# Patient Record
Sex: FEMALE | Race: OTHER
[De-identification: names, ages, dates, MRNs, and addresses within clinical notes are randomized per-mention and may not be internally consistent; named-entity substitution may affect disease eponyms.]

---

## 2019-01-01 ENCOUNTER — HOSPITAL ENCOUNTER (INPATIENT)
Dept: HOSPITAL 54 - ER | Age: 84
LOS: 4 days | Discharge: HOME | DRG: 291 | End: 2019-01-05
Attending: INTERNAL MEDICINE | Admitting: INTERNAL MEDICINE
Payer: MEDICARE

## 2019-01-01 VITALS — DIASTOLIC BLOOD PRESSURE: 53 MMHG | SYSTOLIC BLOOD PRESSURE: 108 MMHG

## 2019-01-01 VITALS — BODY MASS INDEX: 31.02 KG/M2 | WEIGHT: 158 LBS | HEIGHT: 60 IN

## 2019-01-01 VITALS — DIASTOLIC BLOOD PRESSURE: 73 MMHG | SYSTOLIC BLOOD PRESSURE: 111 MMHG

## 2019-01-01 DIAGNOSIS — R60.0: ICD-10-CM

## 2019-01-01 DIAGNOSIS — L03.116: ICD-10-CM

## 2019-01-01 DIAGNOSIS — L30.4: ICD-10-CM

## 2019-01-01 DIAGNOSIS — E78.5: ICD-10-CM

## 2019-01-01 DIAGNOSIS — I13.0: Primary | ICD-10-CM

## 2019-01-01 DIAGNOSIS — M21.70: ICD-10-CM

## 2019-01-01 DIAGNOSIS — E83.42: ICD-10-CM

## 2019-01-01 DIAGNOSIS — L03.115: ICD-10-CM

## 2019-01-01 DIAGNOSIS — E66.3: ICD-10-CM

## 2019-01-01 DIAGNOSIS — M79.662: ICD-10-CM

## 2019-01-01 DIAGNOSIS — I25.10: ICD-10-CM

## 2019-01-01 DIAGNOSIS — Z96.642: ICD-10-CM

## 2019-01-01 DIAGNOSIS — I48.91: ICD-10-CM

## 2019-01-01 DIAGNOSIS — I50.33: ICD-10-CM

## 2019-01-01 DIAGNOSIS — Z95.3: ICD-10-CM

## 2019-01-01 DIAGNOSIS — Z95.1: ICD-10-CM

## 2019-01-01 DIAGNOSIS — I07.1: ICD-10-CM

## 2019-01-01 DIAGNOSIS — D63.8: ICD-10-CM

## 2019-01-01 DIAGNOSIS — Z95.0: ICD-10-CM

## 2019-01-01 DIAGNOSIS — K21.9: ICD-10-CM

## 2019-01-01 DIAGNOSIS — N18.9: ICD-10-CM

## 2019-01-01 DIAGNOSIS — I45.2: ICD-10-CM

## 2019-01-01 DIAGNOSIS — N17.0: ICD-10-CM

## 2019-01-01 LAB
BASOPHILS # BLD AUTO: 0 /CMM (ref 0–0.2)
BASOPHILS NFR BLD AUTO: 0.5 % (ref 0–2)
BUN SERPL-MCNC: 44 MG/DL (ref 7–18)
CALCIUM SERPL-MCNC: 8.6 MG/DL (ref 8.5–10.1)
CHLORIDE SERPL-SCNC: 103 MMOL/L (ref 98–107)
CO2 SERPL-SCNC: 24 MMOL/L (ref 21–32)
CREAT SERPL-MCNC: 2.1 MG/DL (ref 0.6–1.3)
EOSINOPHIL NFR BLD AUTO: 4.7 % (ref 0–6)
GLUCOSE SERPL-MCNC: 96 MG/DL (ref 74–106)
HCT VFR BLD AUTO: 30 % (ref 33–45)
HGB BLD-MCNC: 9.5 G/DL (ref 11.5–14.8)
LYMPHOCYTES NFR BLD AUTO: 1.4 /CMM (ref 0.8–4.8)
LYMPHOCYTES NFR BLD AUTO: 22 % (ref 20–44)
MCHC RBC AUTO-ENTMCNC: 32 G/DL (ref 31–36)
MCV RBC AUTO: 88 FL (ref 82–100)
MONOCYTES NFR BLD AUTO: 0.8 /CMM (ref 0.1–1.3)
MONOCYTES NFR BLD AUTO: 12.9 % (ref 2–12)
NEUTROPHILS # BLD AUTO: 3.8 /CMM (ref 1.8–8.9)
NEUTROPHILS NFR BLD AUTO: 59.9 % (ref 43–81)
NT-PROBNP SERPL-MCNC: 7410 PG/ML (ref 0–125)
PLATELET # BLD AUTO: 322 /CMM (ref 150–450)
POTASSIUM SERPL-SCNC: 4.1 MMOL/L (ref 3.5–5.1)
RBC # BLD AUTO: 3.37 MIL/UL (ref 4–5.2)
SODIUM SERPL-SCNC: 139 MMOL/L (ref 136–145)
WBC NRBC COR # BLD AUTO: 6.3 K/UL (ref 4.3–11)

## 2019-01-01 PROCEDURE — G0378 HOSPITAL OBSERVATION PER HR: HCPCS

## 2019-01-01 RX ADMIN — DEXTROSE MONOHYDRATE SCH MLS/HR: 50 INJECTION, SOLUTION INTRAVENOUS at 21:39

## 2019-01-01 RX ADMIN — MIRTAZAPINE SCH MG: 15 TABLET, FILM COATED ORAL at 21:38

## 2019-01-01 RX ADMIN — ALBUTEROL SULFATE SCH MG: 2.5 SOLUTION RESPIRATORY (INHALATION) at 16:30

## 2019-01-01 RX ADMIN — AMIODARONE HYDROCHLORIDE SCH MG: 200 TABLET ORAL at 18:08

## 2019-01-01 RX ADMIN — METOPROLOL TARTRATE SCH MG: 50 TABLET, FILM COATED ORAL at 21:39

## 2019-01-01 RX ADMIN — ALBUTEROL SULFATE SCH MG: 2.5 SOLUTION RESPIRATORY (INHALATION) at 20:22

## 2019-01-01 NOTE — NUR
RN TELE OPENING NOTES

RECEIVED PATIENT IN BED AWAKE, ALERT AND ORIENTED X3, VERBALLY RESPONSIVE ABLE TO MAKE NEEDS 
KNOWN. Guamanian SPEAKER. BREATHING EVEN AND UNLABORED. NO SOB NOTED. TOLERATING ROOM AIR. IV 
ON RIGHT G#20 INTACT AND PATENT. SKIN DRY AND WARM TO TOUCH. AFEBRILE. NO COMPLAINTS OF PAIN 
OR DISCOMFORT. NO FACIAL GRIMACING. ALL OTHER NEEDS ATTENDED TO. SAFETY MEASURES IN PLACE. 
CALL LIGHT WITHIN REACH. WILL CONTINUE TO MONITOR.

## 2019-01-01 NOTE — NUR
TELE/RN CLOSING NOTE



PATIENT IN BED IN STABLE CONDITION. A/O X 3. Korean SPEAKING. NO SIGNS OF ACUTE DISTRESS. 
NO COMPLAIN OF PAIN OR DISCOMFORT. ON TELE MONITOR NOTED WITH SINUS RHYTHM. ALL NEEDS 
ATTENDED TO. SON AT BEDSIDE. CALL LIGHT WITHIN REACH. WILL ENDORSE TO NEXT SHIFT FOR 
CONTINUITY OF CARE.

## 2019-01-01 NOTE — NUR
RT

SCANNED MEDICINE, PT REFUSED TREATMENT, NO DISTRESS NOTED. WILL RETURN MEDICINE. WILL 
CONTINUE TO MONITOR.

## 2019-01-01 NOTE — NUR
BIB  FROM HOME SOB X 24 HR PROGRESSIVELY WORST WITH DRE LEG EDEMA. ALERT 
AND ORIENTED X 3, Yakut SPEAKING, SON AT BEDSIDE FOR TRANSLATION. BREATHING 
EVENLY AND UNLABORED. DENIES ANY PAIN AT THIS TIME. IV ACCESS INITIATED. DR. HORTON AT BEDSIDE FOR EVAL. KEPT COMFORTABLE. WILL CONTINUE TO MONITOR 
ACCORDINGLY.

## 2019-01-01 NOTE — NUR
WHEELED PATIENT VIA GURNEY ACCOMPANIED BY EMT AND RN, IN NO APPARENT DISTRESS 
NOTED. AZALEA AT BEDSIDE AND RECEIVED THE PATIENT FOR VENKATA.

## 2019-01-01 NOTE — NUR
RN TELE NOTES

DR. ANDERSON HERE IN UNIT. MADE AWARE OF SON'S WISHES FOR PATIENT TO BE CPR ONLY, NO 
INTUBATION.

## 2019-01-01 NOTE — NUR
TELE/RN ADMISSION



PATIENT ADMITTED FROM ER IN STABLE CONDITION TRANSFERRED VIA STRETCHER. ADMITTING DIAGNOSIS 
CHF. A/O X 3, Kyrgyz SPEAKING. NO SIGNS OF ACUTE DISTRESS. NO COMPLAIN OF PAIN OR 
DISCOMFORT AT THIS TIME. ON ROOM AIR SATURATION 97 %. ON TELE MONITOR NOTED WITH SINUS 
RHYTHM IN 70'S. SKIN ASSESSMENT DONE NOTED WITH BILATERAL LOWER EXTREMITIES PITTING EDEMA 
+4. CONTINENT OF BOWEL AND BLADDER USES BEDPAN. AMBULATES WITH ASSIST AT HOME. ALL NEEDS 
ATTENDED TO. CALL LIGHT WITHIN REACH. WILL CONTINUE TO MONITOR TO ENSURE SAFETY.

## 2019-01-02 VITALS — DIASTOLIC BLOOD PRESSURE: 66 MMHG | SYSTOLIC BLOOD PRESSURE: 135 MMHG

## 2019-01-02 VITALS — DIASTOLIC BLOOD PRESSURE: 58 MMHG | SYSTOLIC BLOOD PRESSURE: 135 MMHG

## 2019-01-02 VITALS — SYSTOLIC BLOOD PRESSURE: 97 MMHG | DIASTOLIC BLOOD PRESSURE: 53 MMHG

## 2019-01-02 VITALS — DIASTOLIC BLOOD PRESSURE: 57 MMHG | SYSTOLIC BLOOD PRESSURE: 123 MMHG

## 2019-01-02 LAB
BASOPHILS # BLD AUTO: 0 /CMM (ref 0–0.2)
BASOPHILS NFR BLD AUTO: 0.3 % (ref 0–2)
BUN SERPL-MCNC: 43 MG/DL (ref 7–18)
CALCIUM SERPL-MCNC: 8.2 MG/DL (ref 8.5–10.1)
CHLORIDE SERPL-SCNC: 104 MMOL/L (ref 98–107)
CO2 SERPL-SCNC: 26 MMOL/L (ref 21–32)
CREAT SERPL-MCNC: 2.1 MG/DL (ref 0.6–1.3)
EOSINOPHIL NFR BLD AUTO: 4.9 % (ref 0–6)
GLUCOSE SERPL-MCNC: 116 MG/DL (ref 74–106)
HCT VFR BLD AUTO: 25 % (ref 33–45)
HGB BLD-MCNC: 8.2 G/DL (ref 11.5–14.8)
LYMPHOCYTES NFR BLD AUTO: 1.4 /CMM (ref 0.8–4.8)
LYMPHOCYTES NFR BLD AUTO: 21 % (ref 20–44)
MAGNESIUM SERPL-MCNC: 1.2 MG/DL (ref 1.8–2.4)
MCHC RBC AUTO-ENTMCNC: 33 G/DL (ref 31–36)
MCV RBC AUTO: 86 FL (ref 82–100)
MONOCYTES NFR BLD AUTO: 0.8 /CMM (ref 0.1–1.3)
MONOCYTES NFR BLD AUTO: 12.7 % (ref 2–12)
NEUTROPHILS # BLD AUTO: 4 /CMM (ref 1.8–8.9)
NEUTROPHILS NFR BLD AUTO: 61.1 % (ref 43–81)
PHOSPHATE SERPL-MCNC: 3.2 MG/DL (ref 2.5–4.9)
PLATELET # BLD AUTO: 306 /CMM (ref 150–450)
POTASSIUM SERPL-SCNC: 3.7 MMOL/L (ref 3.5–5.1)
RBC # BLD AUTO: 2.92 MIL/UL (ref 4–5.2)
SODIUM SERPL-SCNC: 142 MMOL/L (ref 136–145)
WBC NRBC COR # BLD AUTO: 6.5 K/UL (ref 4.3–11)

## 2019-01-02 RX ADMIN — ALBUTEROL SULFATE SCH MG: 2.5 SOLUTION RESPIRATORY (INHALATION) at 07:26

## 2019-01-02 RX ADMIN — ALBUTEROL SULFATE SCH MG: 2.5 SOLUTION RESPIRATORY (INHALATION) at 20:08

## 2019-01-02 RX ADMIN — MAGNESIUM SULFATE IN DEXTROSE SCH MLS/HR: 10 INJECTION, SOLUTION INTRAVENOUS at 22:48

## 2019-01-02 RX ADMIN — FUROSEMIDE SCH MG: 10 INJECTION INTRAVENOUS at 13:15

## 2019-01-02 RX ADMIN — DEXTROSE MONOHYDRATE SCH MLS/HR: 50 INJECTION, SOLUTION INTRAVENOUS at 23:47

## 2019-01-02 RX ADMIN — METOPROLOL TARTRATE SCH MG: 50 TABLET, FILM COATED ORAL at 08:10

## 2019-01-02 RX ADMIN — MAGNESIUM SULFATE IN DEXTROSE SCH MLS/HR: 10 INJECTION, SOLUTION INTRAVENOUS at 20:43

## 2019-01-02 RX ADMIN — Medication SCH MG: at 08:09

## 2019-01-02 RX ADMIN — FUROSEMIDE SCH MG: 10 INJECTION INTRAVENOUS at 21:00

## 2019-01-02 RX ADMIN — FUROSEMIDE SCH MG: 10 INJECTION INTRAVENOUS at 06:10

## 2019-01-02 RX ADMIN — ALBUTEROL SULFATE SCH MG: 2.5 SOLUTION RESPIRATORY (INHALATION) at 14:36

## 2019-01-02 RX ADMIN — ATORVASTATIN CALCIUM SCH MG: 10 TABLET, FILM COATED ORAL at 08:10

## 2019-01-02 RX ADMIN — AMIODARONE HYDROCHLORIDE SCH MG: 200 TABLET ORAL at 08:10

## 2019-01-02 RX ADMIN — METOPROLOL TARTRATE SCH MG: 50 TABLET, FILM COATED ORAL at 21:00

## 2019-01-02 RX ADMIN — ASPIRIN 81 MG SCH MG: 81 TABLET ORAL at 08:09

## 2019-01-02 RX ADMIN — MAGNESIUM SULFATE IN DEXTROSE SCH MLS/HR: 10 INJECTION, SOLUTION INTRAVENOUS at 23:46

## 2019-01-02 RX ADMIN — MAGNESIUM SULFATE IN DEXTROSE SCH MLS/HR: 10 INJECTION, SOLUTION INTRAVENOUS at 17:47

## 2019-01-02 RX ADMIN — ALBUTEROL SULFATE SCH MG: 2.5 SOLUTION RESPIRATORY (INHALATION) at 11:43

## 2019-01-02 RX ADMIN — MIRTAZAPINE SCH MG: 15 TABLET, FILM COATED ORAL at 23:48

## 2019-01-02 RX ADMIN — AMIODARONE HYDROCHLORIDE SCH MG: 200 TABLET ORAL at 17:00

## 2019-01-02 NOTE — NUR
TELE/RN NOTES

RECEIVED PATIETN IN BED, AWAKE, ALERT X2, REQUIRE EXTENSIVE ASSISTANCE IN TURNING AND 
REPOSITION, RESPIRATION ON RA IN 92% REQUIRE NC AT 2L FOR COMFORT. RECEIVING BREATHING 
TREATMENT, MAGNESIUM IV IS BEING REPLACED FIRST BAG TO GIVE 23 MORE. B/P LOW AT 97/53, ON 
TELE AT SR 90. NO GUARDING OR GRIMACE. WILL MONITOR.

## 2019-01-02 NOTE — NUR
RN TELE NOTES

RECEIVED PT FROM [facility] CAL[facility], PT IS AWAKE, ALERT AND VERBALLY RESPONSIVE, NOT IN PAIN OR 
DISTRESS,  AT BEDSIDE, PT EATING, CALL LIGHT WITHIN REACH.

## 2019-01-02 NOTE — NUR
RN TELE NOTES

PT IN BED, AWAKE, ALERT AND ORIENTED, SEEN AND EXAMINED BY DR. ANDERSON, ORDERS GIVEN, 
MAGNESIUM REPLACEMENT INITIATED, PM CARE DONE, ALL NEEDS ATTENDED.

## 2019-01-02 NOTE — NUR
RN OPENING NOTES



RECEIVED PATIENT N BED RESTING,  AT BEDSIDE. A/OX3-4, ABLE TO MAKE NEEDS KNOWN. NOT 
IN ANY FORM OF DISTRESS. ON ROOM AIR, SATTING 98%. NO SOB. DENIED PAIN OR DISCOMFORT AT THIS 
TIME.IV ACCESS INTACT AND PATENT. KEPT PATIENT SAFE AND COMFORTABLE. BED IN LOW/LOCKED 
POSITION, SIDERAILS UPX2, CALL LIGHT IN REACH. WILL CONTINUE TO MONIOTR ACCORDINGLY.

## 2019-01-02 NOTE — NUR
RN TELE CLOSING NOTES

PATIENT IN BED AWAKE. NO ACUTE CHANGES THROUGHOUT SHIFT.  AT BEDSIDE. BREATHING EVEN 
AND UNLABORED. NO SOB NOTED. TOLERATING ROOM AIR. IV ON RIGHT G#20 INTACT AND PATENT. SKIN 
DRY AND WARM TO TOUCH. AFEBRILE. NO COMPLAINTS OF PAIN OR DISCOMFORT. NO FACIAL GRIMACING. 
KEPT CLEAN DRY AND COMFORTABLE. ALL OTHER NEEDS ATTENDED TO. SAFETY MEASURES IN PLACE. CALL 
LIGHT WITHIN REACH. WILL ENDORSE TO ONCOMING NURSE FOR VENKATA.

## 2019-01-03 VITALS — SYSTOLIC BLOOD PRESSURE: 124 MMHG | DIASTOLIC BLOOD PRESSURE: 60 MMHG

## 2019-01-03 VITALS — DIASTOLIC BLOOD PRESSURE: 52 MMHG | SYSTOLIC BLOOD PRESSURE: 110 MMHG

## 2019-01-03 VITALS — SYSTOLIC BLOOD PRESSURE: 95 MMHG | DIASTOLIC BLOOD PRESSURE: 46 MMHG

## 2019-01-03 VITALS — SYSTOLIC BLOOD PRESSURE: 88 MMHG | DIASTOLIC BLOOD PRESSURE: 35 MMHG

## 2019-01-03 VITALS — DIASTOLIC BLOOD PRESSURE: 51 MMHG | SYSTOLIC BLOOD PRESSURE: 111 MMHG

## 2019-01-03 VITALS — DIASTOLIC BLOOD PRESSURE: 46 MMHG | SYSTOLIC BLOOD PRESSURE: 95 MMHG

## 2019-01-03 VITALS — SYSTOLIC BLOOD PRESSURE: 138 MMHG | DIASTOLIC BLOOD PRESSURE: 60 MMHG

## 2019-01-03 VITALS — SYSTOLIC BLOOD PRESSURE: 102 MMHG | DIASTOLIC BLOOD PRESSURE: 45 MMHG

## 2019-01-03 LAB
APPEARANCE UR: CLEAR
BASOPHILS # BLD AUTO: 0 /CMM (ref 0–0.2)
BASOPHILS NFR BLD AUTO: 0.4 % (ref 0–2)
BILIRUB UR QL STRIP: NEGATIVE
BUN SERPL-MCNC: 41 MG/DL (ref 7–18)
CALCIUM SERPL-MCNC: 8.1 MG/DL (ref 8.5–10.1)
CHLORIDE SERPL-SCNC: 106 MMOL/L (ref 98–107)
CO2 SERPL-SCNC: 26 MMOL/L (ref 21–32)
COLOR UR: YELLOW
CREAT SERPL-MCNC: 2.1 MG/DL (ref 0.6–1.3)
CREAT UR-MCNC: 116.2 MG/DL (ref 30–125)
EOSINOPHIL NFR BLD AUTO: 5.5 % (ref 0–6)
GLUCOSE SERPL-MCNC: 99 MG/DL (ref 74–106)
GLUCOSE UR STRIP-MCNC: NEGATIVE MG/DL
HCT VFR BLD AUTO: 28 % (ref 33–45)
HGB BLD-MCNC: 8.9 G/DL (ref 11.5–14.8)
HGB UR QL STRIP: NEGATIVE ERY/UL
KETONES UR STRIP-MCNC: NEGATIVE MG/DL
LEUKOCYTE ESTERASE UR QL STRIP: (no result)
LYMPHOCYTES NFR BLD AUTO: 1.8 /CMM (ref 0.8–4.8)
LYMPHOCYTES NFR BLD AUTO: 23.7 % (ref 20–44)
MAGNESIUM SERPL-MCNC: 2.3 MG/DL (ref 1.8–2.4)
MCHC RBC AUTO-ENTMCNC: 32 G/DL (ref 31–36)
MCV RBC AUTO: 88 FL (ref 82–100)
MONOCYTES NFR BLD AUTO: 1 /CMM (ref 0.1–1.3)
MONOCYTES NFR BLD AUTO: 13 % (ref 2–12)
NEUTROPHILS # BLD AUTO: 4.4 /CMM (ref 1.8–8.9)
NEUTROPHILS NFR BLD AUTO: 57.4 % (ref 43–81)
NITRITE UR QL STRIP: NEGATIVE
PH UR STRIP: 5.5 [PH] (ref 5–8)
PHOSPHATE SERPL-MCNC: 3.6 MG/DL (ref 2.5–4.9)
PLATELET # BLD AUTO: 314 /CMM (ref 150–450)
POTASSIUM SERPL-SCNC: 3.3 MMOL/L (ref 3.5–5.1)
PROT UR QL STRIP: NEGATIVE MG/DL
PROT UR-MCNC: 16.9 MG/DL (ref 0–11.9)
RBC # BLD AUTO: 3.19 MIL/UL (ref 4–5.2)
RBC #/AREA URNS HPF: (no result) /HPF (ref 0–2)
SODIUM SERPL-SCNC: 144 MMOL/L (ref 136–145)
SODIUM UR-SCNC: 56 MMOL/L (ref 40–220)
UROBILINOGEN UR STRIP-MCNC: 0.2 EU/DL
WBC #/AREA URNS HPF: (no result) /HPF (ref 0–3)
WBC NRBC COR # BLD AUTO: 7.6 K/UL (ref 4.3–11)

## 2019-01-03 RX ADMIN — MIRTAZAPINE SCH MG: 15 TABLET, FILM COATED ORAL at 21:28

## 2019-01-03 RX ADMIN — DEXTROSE MONOHYDRATE SCH MLS/HR: 50 INJECTION, SOLUTION INTRAVENOUS at 20:52

## 2019-01-03 RX ADMIN — FUROSEMIDE SCH MG: 10 INJECTION INTRAVENOUS at 20:46

## 2019-01-03 RX ADMIN — ALBUTEROL SULFATE SCH MG: 2.5 SOLUTION RESPIRATORY (INHALATION) at 21:56

## 2019-01-03 RX ADMIN — AMIODARONE HYDROCHLORIDE SCH MG: 200 TABLET ORAL at 17:00

## 2019-01-03 RX ADMIN — TRAMADOL HYDROCHLORIDE SCH MG: 50 TABLET, FILM COATED ORAL at 10:50

## 2019-01-03 RX ADMIN — ALBUTEROL SULFATE SCH MG: 2.5 SOLUTION RESPIRATORY (INHALATION) at 14:37

## 2019-01-03 RX ADMIN — Medication SCH OZ: at 21:03

## 2019-01-03 RX ADMIN — METOPROLOL TARTRATE SCH MG: 50 TABLET, FILM COATED ORAL at 08:39

## 2019-01-03 RX ADMIN — ASPIRIN 81 MG SCH MG: 81 TABLET ORAL at 08:37

## 2019-01-03 RX ADMIN — ACETAMINOPHEN PRN MG: 325 TABLET ORAL at 23:20

## 2019-01-03 RX ADMIN — Medication SCH OZ: at 14:39

## 2019-01-03 RX ADMIN — FUROSEMIDE SCH MG: 10 INJECTION INTRAVENOUS at 04:47

## 2019-01-03 RX ADMIN — ACETAMINOPHEN PRN MG: 325 TABLET ORAL at 08:38

## 2019-01-03 RX ADMIN — AMIODARONE HYDROCHLORIDE SCH MG: 200 TABLET ORAL at 08:39

## 2019-01-03 RX ADMIN — Medication SCH MG: at 08:38

## 2019-01-03 RX ADMIN — TRAMADOL HYDROCHLORIDE SCH MG: 50 TABLET, FILM COATED ORAL at 21:00

## 2019-01-03 RX ADMIN — ALBUTEROL SULFATE SCH MG: 2.5 SOLUTION RESPIRATORY (INHALATION) at 08:49

## 2019-01-03 RX ADMIN — ALBUTEROL SULFATE SCH MG: 2.5 SOLUTION RESPIRATORY (INHALATION) at 15:56

## 2019-01-03 RX ADMIN — ALBUTEROL SULFATE SCH MG: 2.5 SOLUTION RESPIRATORY (INHALATION) at 19:30

## 2019-01-03 RX ADMIN — FUROSEMIDE SCH MG: 10 INJECTION INTRAVENOUS at 13:00

## 2019-01-03 RX ADMIN — ATORVASTATIN CALCIUM SCH MG: 10 TABLET, FILM COATED ORAL at 08:38

## 2019-01-03 NOTE — NUR
320-1 T

TELE/RN NOTES

PATIENT RESTING COMFORTABLY, REQUIRE REPOSITION AND TURN,ABLE TO SLEEP DURING THE NIGHT, IV 
SITE ON LEFT FORE ARM, ASSISTED AND KEPT COMFORTABLE. WILL ENDORSE .BED LOCKED, WILL 
MONITOR.

## 2019-01-03 NOTE — NUR
WOUND CARE CONSULT. PLEASE SE WOUND CARE RN ASSESSMENT IN PCS FOR TODAY, PATIENT OF PAULINO Gross , WILL SEE PRN

-------------------------------------------------------------------------------

Addendum: 01/03/19 at 1019 by MATHEW HAGEN RN

-------------------------------------------------------------------------------

Amended: Links added.

## 2019-01-03 NOTE — NUR
MS/RN OPENING NOTES

RECEIVED PATIENT IN BED, AWAKE, CAN OPEN EYES, SKIN WARM TO TOUCH, FAMILY INVOLVE, ASSISTED 
WITH BED ESTES, CHECK SKIN, PROVIDE ZGUARD, KEPT COMFORTABLE, MONITORING FOR ANY S/S OF LOW 
BLOOD PRESSURE AND PATIENT FAMILY CONCERN ADDRESSED. CALL LIGHTS WITHIN REACH, BED LOCKED, 
RECEIVED ENDORSEMENT FROM AM RN FOR VENKATA.

## 2019-01-03 NOTE — NUR
ms/rn notes

PATIETN RESTING COMFORTABLY IN BED, OCCASIONAL GRIMACE AND COUGH, PRN COUG MEDICATION TO 
GIVE, NOTICEABLE COUGHING AND DISCOMFORT OR. MIRTAZAPINE MEDICATION TO BE GIVEN AS SCHEDULED 
OREDR, WILL MONITOR , PATIENT REPORTED NO BM FOR 2 DAYS AS NEEDED LACTULOSE GIVEN EARLIER, 
ABLE TO DRINK FLUIDS. WILL MONITOR.

## 2019-01-03 NOTE — NUR
MS/RN NOTES

MD BRISENO MADE AWARE ABOUT PAIN MEDICATION PREFERED TO HAVE AS NEEDED TYLENOL 650 MG PO FOR 
PAIN., MD ORDERED AND CARRIED OUT.

## 2019-01-03 NOTE — NUR
RN OPENING NOTES



RECEIVED PATIENT N BED RESTING,  AT BEDSIDE. A/OX3-4, ABLE TO MAKE NEEDS KNOWN. NOT 
IN ANY FORM OF DISTRESS. ON ROOM AIR, O2 SAT 98%. NO SOB. C/O LT HEEL PAIN/GEN BODY 
PAIN.TYLENOL 650 MG PO GIVEN FOR PAIN MGT.IV ACCESS INTACT AND PATENT. KEPT PATIENT SAFE AND 
COMFORTABLE. BED IN LOW/LOCKED POSITION, SIDERAILS UPX2, CALL LIGHT IN REACH. WILL CONTINUE 
TO MONITOR ACCORDINGLY.

## 2019-01-03 NOTE — NUR
ms/rn notes

WARM COMPRESS PROVIDED ON LEFT FOOT AS PATIETN REPORTED AND VERBALIZED SOME PAIN. TO F/U 
WITH MD FOR PAIN MEDICATION SUCH AS TYLENOL PREFERED BY PATIETNT AND FAMILY.

## 2019-01-03 NOTE — NUR
PT EATING DINNER WITH  AT THIS TIME.DENIES PAIN OR DISTRESS.URINE TO COLLECT.ENDORSED 
TO NIGHT NURSE CARE.

## 2019-01-04 VITALS — DIASTOLIC BLOOD PRESSURE: 46 MMHG | SYSTOLIC BLOOD PRESSURE: 101 MMHG

## 2019-01-04 VITALS — SYSTOLIC BLOOD PRESSURE: 107 MMHG | DIASTOLIC BLOOD PRESSURE: 53 MMHG

## 2019-01-04 VITALS — DIASTOLIC BLOOD PRESSURE: 49 MMHG | SYSTOLIC BLOOD PRESSURE: 101 MMHG

## 2019-01-04 VITALS — DIASTOLIC BLOOD PRESSURE: 51 MMHG | SYSTOLIC BLOOD PRESSURE: 97 MMHG

## 2019-01-04 LAB
ALBUMIN SERPL BCP-MCNC: 2.6 G/DL (ref 3.4–5)
ALP SERPL-CCNC: 61 U/L (ref 46–116)
ALT SERPL W P-5'-P-CCNC: 31 U/L (ref 12–78)
AST SERPL W P-5'-P-CCNC: 33 U/L (ref 15–37)
BASOPHILS # BLD AUTO: 0 /CMM (ref 0–0.2)
BASOPHILS NFR BLD AUTO: 0.1 % (ref 0–2)
BILIRUB SERPL-MCNC: 0.2 MG/DL (ref 0.2–1)
BUN SERPL-MCNC: 45 MG/DL (ref 7–18)
CALCIUM SERPL-MCNC: 8.4 MG/DL (ref 8.5–10.1)
CHLORIDE SERPL-SCNC: 103 MMOL/L (ref 98–107)
CK SERPL-CCNC: 29 U/L (ref 26–192)
CO2 SERPL-SCNC: 27 MMOL/L (ref 21–32)
CREAT SERPL-MCNC: 2.3 MG/DL (ref 0.6–1.3)
EOSINOPHIL NFR BLD AUTO: 7.4 % (ref 0–6)
GLUCOSE SERPL-MCNC: 124 MG/DL (ref 74–106)
HCT VFR BLD AUTO: 25 % (ref 33–45)
HGB BLD-MCNC: 8.2 G/DL (ref 11.5–14.8)
LYMPHOCYTES NFR BLD AUTO: 1.4 /CMM (ref 0.8–4.8)
LYMPHOCYTES NFR BLD AUTO: 16.4 % (ref 20–44)
MAGNESIUM SERPL-MCNC: 2.1 MG/DL (ref 1.8–2.4)
MCHC RBC AUTO-ENTMCNC: 33 G/DL (ref 31–36)
MCV RBC AUTO: 86 FL (ref 82–100)
MONOCYTES NFR BLD AUTO: 1 /CMM (ref 0.1–1.3)
MONOCYTES NFR BLD AUTO: 11.6 % (ref 2–12)
NEUTROPHILS # BLD AUTO: 5.6 /CMM (ref 1.8–8.9)
NEUTROPHILS NFR BLD AUTO: 64.5 % (ref 43–81)
PHOSPHATE SERPL-MCNC: 3.8 MG/DL (ref 2.5–4.9)
PLATELET # BLD AUTO: 341 /CMM (ref 150–450)
POTASSIUM SERPL-SCNC: 4.1 MMOL/L (ref 3.5–5.1)
PROT SERPL-MCNC: 5.9 G/DL (ref 6.4–8.2)
RBC # BLD AUTO: 2.94 MIL/UL (ref 4–5.2)
SODIUM SERPL-SCNC: 140 MMOL/L (ref 136–145)
WBC NRBC COR # BLD AUTO: 8.7 K/UL (ref 4.3–11)

## 2019-01-04 RX ADMIN — Medication SCH OZ: at 20:48

## 2019-01-04 RX ADMIN — ASPIRIN 81 MG SCH MG: 81 TABLET ORAL at 08:42

## 2019-01-04 RX ADMIN — MIRTAZAPINE SCH MG: 15 TABLET, FILM COATED ORAL at 21:02

## 2019-01-04 RX ADMIN — ATORVASTATIN CALCIUM SCH MG: 10 TABLET, FILM COATED ORAL at 08:42

## 2019-01-04 RX ADMIN — ACETAMINOPHEN PRN MG: 325 TABLET ORAL at 17:31

## 2019-01-04 RX ADMIN — ALBUTEROL SULFATE SCH MG: 2.5 SOLUTION RESPIRATORY (INHALATION) at 08:05

## 2019-01-04 RX ADMIN — AMIODARONE HYDROCHLORIDE SCH MG: 200 TABLET ORAL at 17:30

## 2019-01-04 RX ADMIN — ALBUTEROL SULFATE SCH MG: 2.5 SOLUTION RESPIRATORY (INHALATION) at 11:23

## 2019-01-04 RX ADMIN — ALBUTEROL SULFATE SCH MG: 2.5 SOLUTION RESPIRATORY (INHALATION) at 19:30

## 2019-01-04 RX ADMIN — DEXTROSE MONOHYDRATE SCH MLS/HR: 50 INJECTION, SOLUTION INTRAVENOUS at 20:48

## 2019-01-04 RX ADMIN — Medication SCH OZ: at 08:48

## 2019-01-04 RX ADMIN — FUROSEMIDE SCH MG: 10 INJECTION INTRAVENOUS at 04:56

## 2019-01-04 RX ADMIN — TRAMADOL HYDROCHLORIDE SCH MG: 50 TABLET, FILM COATED ORAL at 08:44

## 2019-01-04 RX ADMIN — AMIODARONE HYDROCHLORIDE SCH MG: 200 TABLET ORAL at 08:45

## 2019-01-04 RX ADMIN — TRAMADOL HYDROCHLORIDE SCH MG: 50 TABLET, FILM COATED ORAL at 20:44

## 2019-01-04 RX ADMIN — ALBUTEROL SULFATE SCH MG: 2.5 SOLUTION RESPIRATORY (INHALATION) at 15:27

## 2019-01-04 NOTE — NUR
MS/RN NOTES

MD ORDERED FOR DULCOLAX SUPPOSITORY DAILY PRN AND FLEETS ENEMA EVERY 48 HOURS PRN, ORDER TO 
CARRY OUT, PATIENT AND FAMILY MADE AWARE.

## 2019-01-04 NOTE — NUR
ms/rn notes

patient provided privacy, cleansed and in comfortable position , dulcolax suppository 
administered via rectal for constipation ordered carried out, discussed treatment and to 
monitor relief.

## 2019-01-04 NOTE — NUR
RN NOTES

PATIENT A/OX2, BREATHING EVEN AND UNLABORED, NO SOB NOTED, SUPPOSITORY EFFECTIVE, PATIENT 
HAS A LARGE BM, PERICARE AND SKIN CARE PROVIDED BY BRUCE NOLASCO, NEEDS ATTENDED, CALL LIGHT 
WITHIN REACH, WILL CONTINUE TO MONITOR.

## 2019-01-04 NOTE — NUR
RN NOTES 

PATIENT A/OX2, BREATHING EVEN AND UNLABORED, NO SOB NOTED, NAD, DENIES PAIN OR DISCOMFORT AT 
THIS TIME, PATIENT RECEIVED TYLENOL FOR BACK PAIN, PATIENT HAD 2 BM THROUGHOUT THE SHIFT, 
NEEDS ATTENDED AND MET, CALL LIGHT WITHIN REACH, WILL ENDORSE TO NIGHT SHIFT FOR VENKATA.

## 2019-01-04 NOTE — NUR
NO BM FOR 3 DAYS AS OF TODAY, EVEN AFTER GIVING LACTULOSE PRN ORDER, PRUNE JUICE, LEFT 
MESSAGE TO MD TO INFORM AND GET ORDER FOR MOM,OR FLEET ENEMA OR SUPPOSITORY FOR 
CONSTIPATION.

## 2019-01-04 NOTE — NUR
RN NOTES



RECEIVED NEW ORDER FROM DR. MCKINNON TO APPLY MEPILEX FOAM ON LEFT POSTERIOR HEEL. ORDERS 
NOTED. WILL CONTINUE TO MONITOR PATIENT.

## 2019-01-04 NOTE — NUR
RN NOTES





RECEIVED PATIENT A/OX2, BREATHING EVEN AND UNLABORED, NO SOB NOTED, NO SIGNS OF ANY DISTRESS 
NOTED. SAFETY MEASURES MAINTAINED, CALL LIGHT WITHIN REACH, PROVIDED COMFORT, WILL CONTINUE 
TO MONITOR.

## 2019-01-04 NOTE — NUR
320-1

MS/RN NOTES

PATIENT IN BED REQUIRE ASSISTANCE IN ADLS, FAMILY INVOLVE M MONITORED FOR LOW BLOOD PRESSURE 
AND DISCOMFORT, BED LOCKED,CALLL LIGHTS WITHIN REACH,WILL MONITOR. ATTEND TO BCONCERNS, 
UNABLE TO HAVE BM FOR THE PAST 2 DAYS, AS NEEDED MEDICATION LACTULOSE GIVE ANDPRUNE JUICE TO 
CHECK FOR RELIEF.

## 2019-01-05 VITALS — DIASTOLIC BLOOD PRESSURE: 50 MMHG | SYSTOLIC BLOOD PRESSURE: 111 MMHG

## 2019-01-05 VITALS — SYSTOLIC BLOOD PRESSURE: 116 MMHG | DIASTOLIC BLOOD PRESSURE: 50 MMHG

## 2019-01-05 LAB
ALBUMIN SERPL BCP-MCNC: 2.5 G/DL (ref 3.4–5)
ALP SERPL-CCNC: 65 U/L (ref 46–116)
ALT SERPL W P-5'-P-CCNC: 29 U/L (ref 12–78)
AST SERPL W P-5'-P-CCNC: 29 U/L (ref 15–37)
BASOPHILS # BLD AUTO: 0 /CMM (ref 0–0.2)
BASOPHILS NFR BLD AUTO: 0.3 % (ref 0–2)
BILIRUB SERPL-MCNC: 0.2 MG/DL (ref 0.2–1)
BUN SERPL-MCNC: 43 MG/DL (ref 7–18)
CALCIUM SERPL-MCNC: 8 MG/DL (ref 8.5–10.1)
CHLORIDE SERPL-SCNC: 104 MMOL/L (ref 98–107)
CO2 SERPL-SCNC: 22 MMOL/L (ref 21–32)
CREAT SERPL-MCNC: 2.1 MG/DL (ref 0.6–1.3)
EOSINOPHIL NFR BLD AUTO: 8.6 % (ref 0–6)
GLUCOSE SERPL-MCNC: 113 MG/DL (ref 74–106)
HCT VFR BLD AUTO: 25 % (ref 33–45)
HGB BLD-MCNC: 8.1 G/DL (ref 11.5–14.8)
LYMPHOCYTES NFR BLD AUTO: 1.4 /CMM (ref 0.8–4.8)
LYMPHOCYTES NFR BLD AUTO: 14.9 % (ref 20–44)
MAGNESIUM SERPL-MCNC: 1.8 MG/DL (ref 1.8–2.4)
MCHC RBC AUTO-ENTMCNC: 32 G/DL (ref 31–36)
MCV RBC AUTO: 88 FL (ref 82–100)
MONOCYTES NFR BLD AUTO: 1.2 /CMM (ref 0.1–1.3)
MONOCYTES NFR BLD AUTO: 12.3 % (ref 2–12)
NEUTROPHILS # BLD AUTO: 6 /CMM (ref 1.8–8.9)
NEUTROPHILS NFR BLD AUTO: 63.9 % (ref 43–81)
PHOSPHATE SERPL-MCNC: 3.5 MG/DL (ref 2.5–4.9)
PLATELET # BLD AUTO: 379 /CMM (ref 150–450)
POTASSIUM SERPL-SCNC: 4.2 MMOL/L (ref 3.5–5.1)
PROT SERPL-MCNC: 5.3 G/DL (ref 6.4–8.2)
RBC # BLD AUTO: 2.88 MIL/UL (ref 4–5.2)
SODIUM SERPL-SCNC: 139 MMOL/L (ref 136–145)
WBC NRBC COR # BLD AUTO: 9.4 K/UL (ref 4.3–11)

## 2019-01-05 RX ADMIN — ASPIRIN 81 MG SCH MG: 81 TABLET ORAL at 09:10

## 2019-01-05 RX ADMIN — ALBUTEROL SULFATE SCH MG: 2.5 SOLUTION RESPIRATORY (INHALATION) at 13:49

## 2019-01-05 RX ADMIN — Medication SCH OZ: at 09:11

## 2019-01-05 RX ADMIN — ATORVASTATIN CALCIUM SCH MG: 10 TABLET, FILM COATED ORAL at 09:09

## 2019-01-05 RX ADMIN — AMIODARONE HYDROCHLORIDE SCH MG: 200 TABLET ORAL at 09:10

## 2019-01-05 RX ADMIN — TRAMADOL HYDROCHLORIDE SCH MG: 50 TABLET, FILM COATED ORAL at 09:09

## 2019-01-05 RX ADMIN — ALBUTEROL SULFATE SCH MG: 2.5 SOLUTION RESPIRATORY (INHALATION) at 08:26

## 2019-01-05 RX ADMIN — ALBUTEROL SULFATE SCH MG: 2.5 SOLUTION RESPIRATORY (INHALATION) at 15:20

## 2019-01-05 NOTE — NUR
DISCHARGE NOTES



PT WAS D/C AT THIS TIME IN MEDICALLY STABLE CONDITION BACK HOME WITH AMBULANCE CREW THAT 
WERE CALLED BY THE SON. ID AND IV BAND WERE REMOVED. D/C PAPERWORK AND BELONGING LIST WAS 
DISCUSSED WITH FAMILY AND SIGNED BY THE SON. PHOTOS OF WOUNDS WERE REFUSED BY THE PATIENT 
AND FAMILY EVEN AFTER EDUCATION WAS PROVIDED. THEY WERE TAKEN DOWN AT THIS TIME BY THE 
AMBULANCE CREW. ALL NEEDS WERE ATTENDED TO DURING THEIR STAY

## 2019-01-05 NOTE — NUR
RN NOTES



PATIENT SLEEPING COMFORTABLY, SON AT BEDSIDE, A/OX2, BREATHING EVEN AND NONLABORED, NO SOB 
NOTED, NO SIGNS OF ANY DISTRESS NOTED. SAFETY MEASURES MAINTAINED, CALL LIGHT WITHIN REACH, 
PROVIDED COMFORT, WILL ENDORSE TO AM NURSE FOR VENKATA. FOR POSSIBLE DISCHARGE TODAY.

## 2019-01-05 NOTE — NUR
RN OPENING NOTES



PT WAS RECEIVED IN BED AT LOWEST AND LOCKED POSITION WITH SIDE RAILS UP X2, A/O X2, 
BREATHING EVEN AND UNLABORED ON RA, NO S/S OF PAIN OR DISTRESS NOTED AT THIS TIME, IV IS 
PATENT AND INTACT, FAMILY PRESENT AT BEDSIDE, SAFETY PRECAUTIONS IN PLACE, CALL LIGHT WITHIN 
REACH, WILL MONITOR ACCORDINGLY

## 2019-01-06 LAB — PTH-INTACT SERPL-MCNC: 114 PG/ML (ref 15–65)

## 2019-01-07 LAB
ALBUMIN SERPL ELPH-MCNC: 2.9 G/DL (ref 2.9–4.4)
ALBUMIN/GLOB SERPL: 1.3 {RATIO} (ref 0.7–1.7)
ALPHA1 GLOB SERPL ELPH-MCNC: 0.4 G/DL (ref 0–0.4)
ALPHA2 GLOB SERPL ELPH-MCNC: 0.9 G/DL (ref 0.4–1)
B-GLOBULIN SERPL ELPH-MCNC: 0.8 G/DL (ref 0.7–1.3)
GAMMA GLOB SERPL ELPH-MCNC: 0.3 G/DL (ref 0.4–1.8)
GLOBULIN SER CALC-MCNC: 2.3 G/DL (ref 2.2–3.9)

## 2019-02-11 ENCOUNTER — HOSPITAL ENCOUNTER (INPATIENT)
Dept: HOSPITAL 54 - ER | Age: 84
LOS: 4 days | Discharge: HOME HEALTH | DRG: 562 | End: 2019-02-15
Attending: INTERNAL MEDICINE | Admitting: NURSE PRACTITIONER
Payer: MEDICARE

## 2019-02-11 VITALS — HEIGHT: 64 IN | WEIGHT: 151.13 LBS | BODY MASS INDEX: 25.8 KG/M2

## 2019-02-11 VITALS — DIASTOLIC BLOOD PRESSURE: 69 MMHG | SYSTOLIC BLOOD PRESSURE: 125 MMHG

## 2019-02-11 DIAGNOSIS — Z95.2: ICD-10-CM

## 2019-02-11 DIAGNOSIS — S00.81XA: ICD-10-CM

## 2019-02-11 DIAGNOSIS — I48.91: ICD-10-CM

## 2019-02-11 DIAGNOSIS — N18.9: ICD-10-CM

## 2019-02-11 DIAGNOSIS — R73.9: ICD-10-CM

## 2019-02-11 DIAGNOSIS — D72.829: ICD-10-CM

## 2019-02-11 DIAGNOSIS — E78.5: ICD-10-CM

## 2019-02-11 DIAGNOSIS — I13.0: ICD-10-CM
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
## 2019-02-11 DIAGNOSIS — S42.302A: Primary | ICD-10-CM

## 2019-02-11 DIAGNOSIS — M62.84: ICD-10-CM

## 2019-02-11 DIAGNOSIS — D63.8: ICD-10-CM

## 2019-02-11 DIAGNOSIS — D68.59: ICD-10-CM
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
## 2019-02-11 DIAGNOSIS — I25.10: ICD-10-CM

## 2019-02-11 DIAGNOSIS — Z95.1: ICD-10-CM

## 2019-02-11 DIAGNOSIS — N17.0: ICD-10-CM

## 2019-02-11 DIAGNOSIS — I50.9: ICD-10-CM

## 2019-02-11 DIAGNOSIS — K21.9: ICD-10-CM

## 2019-02-11 DIAGNOSIS — R64: ICD-10-CM

## 2019-02-11 LAB
BASOPHILS # BLD AUTO: 0 /CMM (ref 0–0.2)
BASOPHILS NFR BLD AUTO: 0.1 % (ref 0–2)
BUN SERPL-MCNC: 86 MG/DL (ref 7–18)
CALCIUM SERPL-MCNC: 8.9 MG/DL (ref 8.5–10.1)
CHLORIDE SERPL-SCNC: 105 MMOL/L (ref 98–107)
CO2 SERPL-SCNC: 22 MMOL/L (ref 21–32)
CREAT SERPL-MCNC: 2.5 MG/DL (ref 0.6–1.3)
EOSINOPHIL NFR BLD AUTO: 2.2 % (ref 0–6)
GLUCOSE SERPL-MCNC: 172 MG/DL (ref 74–106)
HCT VFR BLD AUTO: 30 % (ref 33–45)
HGB BLD-MCNC: 9.4 G/DL (ref 11.5–14.8)
LYMPHOCYTES NFR BLD AUTO: 29.8 % (ref 20–44)
LYMPHOCYTES NFR BLD AUTO: 3.8 /CMM (ref 0.8–4.8)
MCHC RBC AUTO-ENTMCNC: 31 G/DL (ref 31–36)
MCV RBC AUTO: 86 FL (ref 82–100)
MONOCYTES NFR BLD AUTO: 1.2 /CMM (ref 0.1–1.3)
MONOCYTES NFR BLD AUTO: 9.3 % (ref 2–12)
NEUTROPHILS # BLD AUTO: 7.5 /CMM (ref 1.8–8.9)
NEUTROPHILS NFR BLD AUTO: 58.6 % (ref 43–81)
PLATELET # BLD AUTO: 274 /CMM (ref 150–450)
POTASSIUM SERPL-SCNC: 4.4 MMOL/L (ref 3.5–5.1)
RBC # BLD AUTO: 3.53 MIL/UL (ref 4–5.2)
SODIUM SERPL-SCNC: 141 MMOL/L (ref 136–145)
WBC NRBC COR # BLD AUTO: 12.8 K/UL (ref 4.3–11)

## 2019-02-11 PROCEDURE — 2W3BX1Z IMMOBILIZATION OF LEFT UPPER ARM USING SPLINT: ICD-10-PCS

## 2019-02-11 PROCEDURE — C9113 INJ PANTOPRAZOLE SODIUM, VIA: HCPCS

## 2019-02-11 PROCEDURE — A6253 ABSORPT DRG > 48 SQ IN W/O B: HCPCS

## 2019-02-11 PROCEDURE — G0378 HOSPITAL OBSERVATION PER HR: HCPCS

## 2019-02-11 RX ADMIN — SODIUM CHLORIDE PRN MLS/HR: 9 INJECTION, SOLUTION INTRAVENOUS at 23:24

## 2019-02-11 RX ADMIN — HYDROMORPHONE HYDROCHLORIDE PRN MG: 2 INJECTION, SOLUTION INTRAMUSCULAR; INTRAVENOUS; SUBCUTANEOUS at 23:18

## 2019-02-11 NOTE — NUR
RN MS NOTES

PATIENT COMPLAINT OF PAIN TO LEFT ARM 9/10, NOTED WITH FACIAL GRIMACING AND MOANS, 
REQUESTING FOR PAIN MEDICATION AND NAUSEA MEDICATION.

VS /69,95%RA,70,98.0,18

DILAUDID 1MG GIVEN , 1MG WASTED WITH ANOTHER RN AND VERIFIED. GIVEN AS ORDERED

ZOFRAN PRN GIVEN AS ORDERED FOR NAUSEA.

WILL CONTINUE TO MONITOR FOR EFFECTIVENESS.

## 2019-02-11 NOTE — NUR
PT BIBRA FROM HOME FOR GLF; PT AAOX4, PT ON MONITOR, VSS, NO SOB NOTED, NAD 
NOTED, PENDING ER PROVIDER MELISSAAL

## 2019-02-11 NOTE — NUR
RN MS ADMITTING OPENING NOTES

RECEIVED PATIENT FROM ER TRANSFERRED VIA GURNEY, SAFELY TRANSFERRED TO BED, PATIENT AWAKE 
,ALERT AND ORIENTED X 1-2 , NOTED WITH EPISODES OF FORGETFULNESS, SLING AND ARM BANDAGE TO 
LEFT ARM INTACT.IV SITE TO RIGHT HAND #22G INTACT AND PATENT, NO REDNESS, NO INFILTRATION 
PRESENT, BODY ASSESSMENT DONE, NOTED WITH BLE SKIN DRY NESS, MULTIPLE SCATTERED ABRASIONS TO 
RIGHT SIDE OF FACE AND BRIDGE OF NOSE. SACRAL INTACT NO REDNESS. PICTURES TAKEN, PERINEAL 
CARE PROVIDED, BELONGINGS LIST DONE, FAMILY AT BEDSIDE SON TALYA DECISION MAKER . 
PROVIDED PATIENT MEDICAL HISTORY PER SON WILL STAY WITH PATIENT. ORIENTED TO STAFF AND CALL 
LIGHT AND KEPT WITHIN REACH, SAFETY PRECAUTIONS IN PLACE, LOW BED AND LOCKED, BED ALARM IN 
PLACE, NO RESPIRATORY DISTRESS PRESENT, MD AWARE OF ADMISSION WILL FOLLOW AS PER MD ORDER, 
ALL NEEDS ATTENDED AT THIS TIME, WILL CONTINUE TO MONITOR AND ATTEND TO NEEDS.

## 2019-02-12 VITALS — SYSTOLIC BLOOD PRESSURE: 109 MMHG | DIASTOLIC BLOOD PRESSURE: 65 MMHG

## 2019-02-12 VITALS — SYSTOLIC BLOOD PRESSURE: 109 MMHG | DIASTOLIC BLOOD PRESSURE: 59 MMHG

## 2019-02-12 LAB
ALBUMIN SERPL BCP-MCNC: 3.4 G/DL (ref 3.4–5)
ALP SERPL-CCNC: 58 U/L (ref 46–116)
ALT SERPL W P-5'-P-CCNC: 19 U/L (ref 12–78)
APPEARANCE UR: CLEAR
APPEARANCE UR: CLEAR
AST SERPL W P-5'-P-CCNC: 18 U/L (ref 15–37)
BASOPHILS # BLD AUTO: 0 /CMM (ref 0–0.2)
BASOPHILS NFR BLD AUTO: 0.1 % (ref 0–2)
BILIRUB SERPL-MCNC: 0.4 MG/DL (ref 0.2–1)
BILIRUB UR QL STRIP: NEGATIVE
BILIRUB UR QL STRIP: NEGATIVE
BUN SERPL-MCNC: 78 MG/DL (ref 7–18)
CALCIUM SERPL-MCNC: 8.3 MG/DL (ref 8.5–10.1)
CHLORIDE SERPL-SCNC: 106 MMOL/L (ref 98–107)
CHOLEST SERPL-MCNC: 135 MG/DL (ref ?–200)
CO2 SERPL-SCNC: 23 MMOL/L (ref 21–32)
COLOR UR: YELLOW
COLOR UR: YELLOW
CREAT SERPL-MCNC: 2.4 MG/DL (ref 0.6–1.3)
CREAT UR-MCNC: 114.7 MG/DL (ref 30–125)
EOSINOPHIL NFR BLD AUTO: 0 % (ref 0–6)
GLUCOSE SERPL-MCNC: 140 MG/DL (ref 74–106)
GLUCOSE UR STRIP-MCNC: NEGATIVE MG/DL
GLUCOSE UR STRIP-MCNC: NEGATIVE MG/DL
HCT VFR BLD AUTO: 27 % (ref 33–45)
HDLC SERPL-MCNC: 54 MG/DL (ref 40–60)
HGB BLD-MCNC: 8.6 G/DL (ref 11.5–14.8)
HGB UR QL STRIP: NEGATIVE ERY/UL
HGB UR QL STRIP: NEGATIVE ERY/UL
KETONES UR STRIP-MCNC: NEGATIVE MG/DL
KETONES UR STRIP-MCNC: NEGATIVE MG/DL
LDLC SERPL DIRECT ASSAY-MCNC: 71 MG/DL (ref 0–99)
LEUKOCYTE ESTERASE UR QL STRIP: (no result)
LEUKOCYTE ESTERASE UR QL STRIP: NEGATIVE
LYMPHOCYTES NFR BLD AUTO: 1.2 /CMM (ref 0.8–4.8)
LYMPHOCYTES NFR BLD AUTO: 11.3 % (ref 20–44)
MAGNESIUM SERPL-MCNC: 1.8 MG/DL (ref 1.8–2.4)
MCHC RBC AUTO-ENTMCNC: 32 G/DL (ref 31–36)
MCV RBC AUTO: 86 FL (ref 82–100)
MONOCYTES NFR BLD AUTO: 0.6 /CMM (ref 0.1–1.3)
MONOCYTES NFR BLD AUTO: 6 % (ref 2–12)
NEUTROPHILS # BLD AUTO: 8.8 /CMM (ref 1.8–8.9)
NEUTROPHILS NFR BLD AUTO: 82.6 % (ref 43–81)
NITRITE UR QL STRIP: NEGATIVE
NITRITE UR QL STRIP: NEGATIVE
PH UR STRIP: 5 [PH] (ref 5–8)
PH UR STRIP: 5 [PH] (ref 5–8)
PHOSPHATE SERPL-MCNC: 4.6 MG/DL (ref 2.5–4.9)
PLATELET # BLD AUTO: 230 /CMM (ref 150–450)
POTASSIUM SERPL-SCNC: 4.6 MMOL/L (ref 3.5–5.1)
PROT SERPL-MCNC: 7 G/DL (ref 6.4–8.2)
PROT UR QL STRIP: NEGATIVE MG/DL
PROT UR QL STRIP: NEGATIVE MG/DL
PROT UR-MCNC: 16.3 MG/DL (ref 0–11.9)
RBC # BLD AUTO: 3.16 MIL/UL (ref 4–5.2)
RBC #/AREA URNS HPF: (no result) /HPF (ref 0–2)
SODIUM SERPL-SCNC: 142 MMOL/L (ref 136–145)
SODIUM UR-SCNC: 11 MMOL/L (ref 40–220)
TRIGL SERPL-MCNC: 56 MG/DL (ref 30–150)
UROBILINOGEN UR STRIP-MCNC: 0.2 EU/DL
UROBILINOGEN UR STRIP-MCNC: 0.2 EU/DL
WBC #/AREA URNS HPF: (no result) /HPF (ref 0–3)
WBC NRBC COR # BLD AUTO: 10.6 K/UL (ref 4.3–11)

## 2019-02-12 RX ADMIN — METOPROLOL TARTRATE SCH MG: 50 TABLET, FILM COATED ORAL at 09:00

## 2019-02-12 RX ADMIN — MIRTAZAPINE SCH MG: 15 TABLET, FILM COATED ORAL at 21:31

## 2019-02-12 RX ADMIN — AMIODARONE HYDROCHLORIDE SCH MG: 200 TABLET ORAL at 17:16

## 2019-02-12 RX ADMIN — NEOMYCIN AND POLYMYXIN B SULFATES AND BACITRACIN ZINC SCH APPLIC: 400; 3.5; 5 OINTMENT TOPICAL at 15:30

## 2019-02-12 RX ADMIN — SODIUM CHLORIDE PRN MLS/HR: 9 INJECTION, SOLUTION INTRAVENOUS at 21:36

## 2019-02-12 RX ADMIN — METOPROLOL TARTRATE SCH MG: 50 TABLET, FILM COATED ORAL at 21:00

## 2019-02-12 RX ADMIN — HYDROMORPHONE HYDROCHLORIDE PRN MG: 2 INJECTION, SOLUTION INTRAMUSCULAR; INTRAVENOUS; SUBCUTANEOUS at 16:06

## 2019-02-12 RX ADMIN — AMIODARONE HYDROCHLORIDE SCH MG: 200 TABLET ORAL at 09:00

## 2019-02-12 RX ADMIN — Medication PRN EACH: at 09:59

## 2019-02-12 RX ADMIN — ASPIRIN 81 MG SCH MG: 81 TABLET ORAL at 09:56

## 2019-02-12 RX ADMIN — Medication PRN EACH: at 04:21

## 2019-02-12 RX ADMIN — ATORVASTATIN CALCIUM SCH MG: 10 TABLET, FILM COATED ORAL at 09:56

## 2019-02-12 RX ADMIN — SODIUM CHLORIDE SCH MG: 9 INJECTION, SOLUTION INTRAVENOUS at 09:56

## 2019-02-12 RX ADMIN — Medication PRN EACH: at 21:31

## 2019-02-12 RX ADMIN — Medication SCH MG: at 09:00

## 2019-02-12 NOTE — NUR
RN MS CLOSING NOTES

 PATIENT AWAKE ,ALERT AND ORIENTED X 1-2 , NOTED WITH EPISODES OF FORGETFULNESS, SLING AND 
ARM BANDAGE TO LEFT ARM INTACT.IV SITE TO RIGHT HAND #22G INTACT AND PATENT, NO REDNESS, NO 
INFILTRATION PRESENT, BED ESTES OFFERED THROUGHOUT SHIFT,  PERINEAL CARE PROVIDED, FAMILY AT 
BEDSIDE SON TALYA ,CALL LIGHT  KEPT WITHIN REACH, SAFETY PRECAUTIONS IN PLACE, LOW BED AND 
LOCKED, BED ALARM IN PLACE, NO RESPIRATORY DISTRESS PRESENT,  ALL NEEDS ATTENDED AT THIS 
TIME, WILL CONTINUE TO MONITOR AND  ENDORSE TO NEXT SHIFT. PAIN MANAGEMENT PROVIDED AS 
ORDERED.

## 2019-02-12 NOTE — NUR
RN MS NOTES

PATIENT COMPLAINT OF PAIN TO LEFT ARM 6/10 REQUESTING FOR PAIN MEDICATION

VS TAKEN PER SON B/P TENDS TO RUN ON THE LOWER SIDE AROUND 110/50'S

/78,69,97%RA,18 RIGHT LEG

VS RIGHT /46,68,97%RA, 18

NORCO 5/325 MG GIVEN WILL CONTINUE TO MONITOR.

## 2019-02-12 NOTE — NUR
RN MS OPENING NOTES

RECEIVED PATIENT IN BED AWAKE, ALERT AND ORIENTED X 2, WITH PERIODS OF FORGETFULNESS, 
RESPIRATIONS EVEN AND UNLABORED WITH EQUAL RISE AND FALL OF CHEST, DENIES ANY PAIN OR 
DISCOMFORT AT THIS TIME, ORIENTED TO STAFF AND CALL LIGHT AND KEPT WITHIN REACH, BED PAN 
OFFERED, PATIENT HAS COAPTATION SLING TO LEFT ARM INTACT, NWB TO LEFT ARM, IV SITE TO RIGHT 
HAND #22 G INTACT AND PATENT, IVF RUNNING AS ORDERED, ALL NEEDS ATTENDED AT THIS TIME, 
REMAIN COMFORTABLE WILL CONTINUE TO MONITOR.

## 2019-02-12 NOTE — NUR
RN MS NOTES

PATIENT COMPLAINT OF PAIN TO LEFT ARM 4/10 ACHING, MOANING, GRASPING SITE

REQUESTING FOR PAIN MEDICATION

NORC0 5/325  MG GIVEN 

VS WNL

109/59,69,19,96% RA 

PER SON THIS IS PATIENT B/P TRENDS ON LOWER SIDE PER SON THIS IS HER BASELINE

## 2019-02-12 NOTE — NUR
RN OPENING NOTES



RECEIVED PATIENT IN BED AWAKE. ALERT AND ORIENTED X 1-2 , NOTED WITH EPISODES OF 
FORGETFULNESS, SLING AND ARM BANDAGE TO LEFT ARM INTACT. NOT IN ANY FORM OF DISTRESS, NO 
SOB. DENIED PAIN OR DISCOMFORT AT THIS TIME. IV SITE TO RIGHT HAND #22G INTACT AND PATENT, 
NO REDNESS, NO INFILTRATION. FAMILY AT BEDSIDE SON TALYA. KEPT PATIENT SAFE AND COMFORTABLE. 
BED IN LOW/LOCKED POSITION, BED ALARM ON, SIDERAILS UPX2, HOB ELEVATED. CALL LIGHT IN REACH. 
WILL CONTINUE TO MONIOTR ACCORDINGLY.

## 2019-02-12 NOTE — NUR
RN NOTES

COAPTATION SPLINT IN PLACE, PHYSICAL THERAPIST APPLIED/PLACED SPLINT ON PATIENT. PATIENT 
TOLERATED WELL.

## 2019-02-12 NOTE — NUR
RN MS NOTES

LOPRESSOR HELD DUE TO LOW BLOOD PRESSURE. 109/59.

PER SON DOES NOT WANT TO [place] TO HAVE BP MEDS AT THIS TIME.

## 2019-02-12 NOTE — NUR
RN CLOSING NOTES



PATIENT IN STABLE CONDITION. ALL NEEDS ATTENDED AND PROVIDED. ALL DUE MEDICATIONS GIVEN AS 
ORDERED. KEPT PATIENT SAFE AND COMFORTABLE. TURNED AND REPOSITIONED EVERY 2HRS AS NEEDED. 
BED IN LOW/LOCKED POSITION, SIDERAILS UPX2, HOB ELEVATED. CALL LIGHT IN REACH. ENDORSED TO 
NIGHT RN FOR VENKATA

## 2019-02-13 VITALS — SYSTOLIC BLOOD PRESSURE: 90 MMHG | DIASTOLIC BLOOD PRESSURE: 50 MMHG

## 2019-02-13 VITALS — SYSTOLIC BLOOD PRESSURE: 99 MMHG | DIASTOLIC BLOOD PRESSURE: 50 MMHG

## 2019-02-13 VITALS — SYSTOLIC BLOOD PRESSURE: 96 MMHG | DIASTOLIC BLOOD PRESSURE: 54 MMHG

## 2019-02-13 VITALS — SYSTOLIC BLOOD PRESSURE: 104 MMHG | DIASTOLIC BLOOD PRESSURE: 49 MMHG

## 2019-02-13 VITALS — SYSTOLIC BLOOD PRESSURE: 101 MMHG | DIASTOLIC BLOOD PRESSURE: 50 MMHG

## 2019-02-13 VITALS — DIASTOLIC BLOOD PRESSURE: 44 MMHG | SYSTOLIC BLOOD PRESSURE: 99 MMHG

## 2019-02-13 VITALS — SYSTOLIC BLOOD PRESSURE: 115 MMHG | DIASTOLIC BLOOD PRESSURE: 54 MMHG

## 2019-02-13 LAB
ALBUMIN SERPL BCP-MCNC: 2.9 G/DL (ref 3.4–5)
ALP SERPL-CCNC: 48 U/L (ref 46–116)
ALT SERPL W P-5'-P-CCNC: 14 U/L (ref 12–78)
AST SERPL W P-5'-P-CCNC: 13 U/L (ref 15–37)
BASOPHILS # BLD AUTO: 0 /CMM (ref 0–0.2)
BASOPHILS NFR BLD AUTO: 0.1 % (ref 0–2)
BILIRUB SERPL-MCNC: 0.2 MG/DL (ref 0.2–1)
BUN SERPL-MCNC: 85 MG/DL (ref 7–18)
CALCIUM SERPL-MCNC: 8.1 MG/DL (ref 8.5–10.1)
CHLORIDE SERPL-SCNC: 108 MMOL/L (ref 98–107)
CK SERPL-CCNC: 96 U/L (ref 26–192)
CO2 SERPL-SCNC: 22 MMOL/L (ref 21–32)
CREAT SERPL-MCNC: 2.9 MG/DL (ref 0.6–1.3)
EOSINOPHIL NFR BLD AUTO: 1.9 % (ref 0–6)
FERRITIN SERPL-MCNC: 34 NG/ML (ref 8–388)
GLUCOSE SERPL-MCNC: 115 MG/DL (ref 74–106)
HCT VFR BLD AUTO: 23 % (ref 33–45)
HGB BLD-MCNC: 7.3 G/DL (ref 11.5–14.8)
IRON SERPL-MCNC: 13 UG/DL (ref 50–175)
LYMPHOCYTES NFR BLD AUTO: 2.3 /CMM (ref 0.8–4.8)
LYMPHOCYTES NFR BLD AUTO: 30.1 % (ref 20–44)
MAGNESIUM SERPL-MCNC: 1.8 MG/DL (ref 1.8–2.4)
MCHC RBC AUTO-ENTMCNC: 32 G/DL (ref 31–36)
MCV RBC AUTO: 86 FL (ref 82–100)
MONOCYTES NFR BLD AUTO: 0.9 /CMM (ref 0.1–1.3)
MONOCYTES NFR BLD AUTO: 11.9 % (ref 2–12)
NEUTROPHILS # BLD AUTO: 4.3 /CMM (ref 1.8–8.9)
NEUTROPHILS NFR BLD AUTO: 56 % (ref 43–81)
PHOSPHATE SERPL-MCNC: 4.4 MG/DL (ref 2.5–4.9)
PLATELET # BLD AUTO: 190 /CMM (ref 150–450)
POTASSIUM SERPL-SCNC: 4.4 MMOL/L (ref 3.5–5.1)
PROT SERPL-MCNC: 5.9 G/DL (ref 6.4–8.2)
RBC # BLD AUTO: 2.69 MIL/UL (ref 4–5.2)
SODIUM SERPL-SCNC: 138 MMOL/L (ref 136–145)
TIBC SERPL-MCNC: 255 UG/DL (ref 250–450)
WBC NRBC COR # BLD AUTO: 7.8 K/UL (ref 4.3–11)

## 2019-02-13 RX ADMIN — METOPROLOL TARTRATE SCH MG: 50 TABLET, FILM COATED ORAL at 21:00

## 2019-02-13 RX ADMIN — SODIUM CHLORIDE SCH MG: 9 INJECTION, SOLUTION INTRAVENOUS at 08:25

## 2019-02-13 RX ADMIN — Medication SCH MG: at 21:05

## 2019-02-13 RX ADMIN — AMIODARONE HYDROCHLORIDE SCH MG: 200 TABLET ORAL at 16:18

## 2019-02-13 RX ADMIN — Medication SCH MG: at 08:25

## 2019-02-13 RX ADMIN — ASPIRIN 81 MG SCH MG: 81 TABLET ORAL at 08:26

## 2019-02-13 RX ADMIN — Medication PRN EACH: at 21:06

## 2019-02-13 RX ADMIN — METOPROLOL TARTRATE SCH MG: 50 TABLET, FILM COATED ORAL at 08:25

## 2019-02-13 RX ADMIN — MIRTAZAPINE SCH MG: 15 TABLET, FILM COATED ORAL at 21:05

## 2019-02-13 RX ADMIN — SODIUM CHLORIDE PRN MLS/HR: 9 INJECTION, SOLUTION INTRAVENOUS at 10:37

## 2019-02-13 RX ADMIN — NEOMYCIN AND POLYMYXIN B SULFATES AND BACITRACIN ZINC SCH APPLIC: 400; 3.5; 5 OINTMENT TOPICAL at 08:27

## 2019-02-13 RX ADMIN — Medication PRN EACH: at 16:59

## 2019-02-13 RX ADMIN — Medication PRN EACH: at 06:39

## 2019-02-13 RX ADMIN — ATORVASTATIN CALCIUM SCH MG: 10 TABLET, FILM COATED ORAL at 08:26

## 2019-02-13 RX ADMIN — ACETAMINOPHEN PRN MG: 325 TABLET ORAL at 11:53

## 2019-02-13 RX ADMIN — AMIODARONE HYDROCHLORIDE SCH MG: 200 TABLET ORAL at 08:26

## 2019-02-13 NOTE — NUR
RN MS NOTES

PATIENT CHANGED HER MIND NOW WANTS PAIN MEDICATION VIA 

VS TAKEN /67,18,69,98%RA

NORCO 5/325MG OFFERED.

WILL CONTINUE TO MONITOR FOR EFFECTIVENESS.

## 2019-02-13 NOTE — NUR
RN MS NOTES

PATIENT REFUSED BED BATH AND LINE CHANGE, WITH Swedish  PER PATIENT " THEY DID IT 
LAST NIGHT." 

PERINEAL AREA ASSESSED NO REDNESS, NO INFILTRATION. SACRAL INTACT.

REPOSITIONED

## 2019-02-13 NOTE — NUR
MS RN NOTE



NORCO 5-325MG PO ADMINISTERED AS ORDERED FOR PAIN OF THE LEFT ARM 9/10. BP: 115/54, HR: 65, 
SP02: 96%.

## 2019-02-13 NOTE — NUR
MS RN OPENING NOTE



RECEIVED PT IN BED, EYES OPEN SPONTANEOUSLY TO VERBAL AND TACTILE STIMULI, PT IS PRIMARILY 
[place] Islander SPEAKING, HOWEVER IS ABLE TO MAKE BASIC NEEDS KNOWN IN ENGLISH. NO ACUTE DISTRESS 
NOTED AT THIS TIME, BREATHING IS EVEN AND UNLABORED ON ROOM AIR. R HAND #22G IV IS INFUSING 
NS @ 75ML/HR WITHOUT REDNESS OR SWELLING. SPLINT IS IN PLACE TO THE LEFT HUMERUS, ASPIRATION 
PRECAUTIONS MAINTAINED. ALL NEEDS ATTENDED TO. BED IS LOCKED AND IN LOWEST POSITION, SIDE 
RAILS UP X3, BED ALARM ON, CALL LIGHT AND POSSESSIONS WITHIN REACH.

## 2019-02-13 NOTE — NUR
RN MS NOTES

PATIENT PULLED OUT IV SITE, 

NO PAIN NO DISCOMFORT TO SITE NO BLEEDING,NO REDNESS, NO INFILTRATION

NEW IV SITE INSERTED TO RIGHT WRIST #22G INTACT AND PATENT

## 2019-02-13 NOTE — NUR
MS RN NOTE



INFORMED DR. PINEDA REGARDING MANUAL BP: 90/50 AFTER ROUTINE AM BLOOD PRESSURE MEDICATION 
ADMINISTRATION. AWAITING RESPONSE.

## 2019-02-13 NOTE — NUR
MS RN CLOSING NOTE



PT IN BED, SLEEPING. EYES OPEN SPONTANEOUSLY TO VERBAL AND TACTILE STIMULI, PT IS PRIMARILY 
Jordanian SPEAKING, HOWEVER IS ABLE TO MAKE BASIC NEEDS KNOWN IN ENGLISH. NO ACUTE DISTRESS 
NOTED AT THIS TIME, BREATHING IS EVEN AND UNLABORED ON ROOM AIR. R HAND #22G IV IS INFUSING 
NS @ 75ML/HR WITHOUT REDNESS OR SWELLING. SPLINT IS IN PLACE TO THE LEFT HUMERUS WITH 
NEUROVASCULAR STATUS AT BASELINE. ASPIRATION PRECAUTIONS MAINTAINED. SKIN CHECK COMPLETED 
WITH WERO MNAUEL TODAY AS ORDERED, SKIN IS AT BASELINE. ADLS PROVIDED AND PT TURNED 
AND REPOSITIONED Q2H FOR THE DURATION OF THE SHIFT. WOUND CARE RENDERED AS ORDERED. ALL 
NEEDS ATTENDED TO. BED IS LOCKED AND IN LOWEST POSITION, SIDE RAILS UP X3, BED ALARM ON, 
CALL LIGHT AND POSSESSIONS WITHIN REACH. WILL ENDORSE TO NIGHT SHIFT NURSE FOR CONTINUITY OF 
CARE.

## 2019-02-13 NOTE — NUR
MS RN NOTE



WERO ROMERO FOR DR. CARTAGENA AT THE BEDSIDE FOR PT ASSESSMENT. PER SHAHEEN SKIN 
ASSESSMENT REQUIRED Q SHIFT AND OKAY TO REMOVED BRACE AND SLING FOR SKIN ASSESSMENT. PER 
SHAHEEN, DR. CARTAGENA TO BE BY LATER TODAY FOR ASSESSMENT AS WELL.

## 2019-02-13 NOTE — NUR
RN NOTES

RECEIVED PT. AWAKE ON BED, A/OX2-3, Jamaican SPEAKING, SON AT BED SIDE , LEFT SHOULDER ON 
SHOULDER SLING AND SPLINT, NO PAIN NOTED, AT THIS TIME, NO SOB, CALL LIGHT WITHIN REACH, 
SIDERIALSUPX2, CONTINUE TO MONITOR

## 2019-02-13 NOTE — NUR
MS RN NOTE



PER CHARGE NURSE SHELLY, NO NEW ORDERS FROM DR. PINEDA REGARDING HGB 7.3 AND HCT 23 AT THIS 
TIME.

## 2019-02-13 NOTE — NUR
RN MS CLOSING NOTES

 PATIENT IN BED AWAKE, ALERT AND ORIENTED X 2, WITH PERIODS OF FORGETFULNESS, RESPIRATIONS 
EVEN AND UNLABORED WITH EQUAL RISE AND FALL OF CHEST98% SPO2 RA, RESP 18,  CALL LIGHTKEPT 
WITHIN REACH, BED PAN OFFERED,FLUIDS OFFERED, PATIENT HAS COAPTATION SLING TO LEFT ARM 
INTACT, NWB TO LEFT ARM, IV SITE TO RIGHT WRIST #22 G INTACT AND PATENT, IVF RUNNING AS 
ORDERED, ALL NEEDS ATTENDED AT THIS TIME, REMAIN COMFORTABLE, REPOSITIONED WILL CONTINUE TO 
MONITOR AND ENDORSE TO NEXT SHIFT, NO CHANGES NOTED THROUGHOUT SHIFT.

## 2019-02-13 NOTE — NUR
RN MS NOTES

MADE SARA AWARE OF URINE RESULTS WITH NEW ORDER NOTED AND CARRIED. WILL CARRY OUT AS 
ORDERED.

## 2019-02-13 NOTE — NUR
MS RN NOTE



PER CHARGE NURSE CABRERA BRAVO PA FOR DR. CARTAGENA CALLED AND STATED THAT SKIN 
CHECK UNDER BRACE IS ONLY REQUIRED DAILY RATHER THAN Q SHIFT. WILL UPDATE ORDERS.

## 2019-02-14 VITALS — SYSTOLIC BLOOD PRESSURE: 114 MMHG | DIASTOLIC BLOOD PRESSURE: 52 MMHG

## 2019-02-14 VITALS — DIASTOLIC BLOOD PRESSURE: 64 MMHG | SYSTOLIC BLOOD PRESSURE: 101 MMHG

## 2019-02-14 VITALS — DIASTOLIC BLOOD PRESSURE: 70 MMHG | SYSTOLIC BLOOD PRESSURE: 118 MMHG

## 2019-02-14 LAB
ALBUMIN SERPL ELPH-MCNC: 2.9 G/DL (ref 2.9–4.4)
ALBUMIN/GLOB SERPL: 1.1 {RATIO} (ref 0.7–1.7)
ALPHA1 GLOB SERPL ELPH-MCNC: 0.3 G/DL (ref 0–0.4)
ALPHA2 GLOB SERPL ELPH-MCNC: 0.8 G/DL (ref 0.4–1)
B-GLOBULIN SERPL ELPH-MCNC: 0.8 G/DL (ref 0.7–1.3)
BASOPHILS # BLD AUTO: 0 /CMM (ref 0–0.2)
BASOPHILS NFR BLD AUTO: 0.1 % (ref 0–2)
BUN SERPL-MCNC: 74 MG/DL (ref 7–18)
CALCIUM SERPL-MCNC: 8.3 MG/DL (ref 8.5–10.1)
CHLORIDE SERPL-SCNC: 111 MMOL/L (ref 98–107)
CO2 SERPL-SCNC: 20 MMOL/L (ref 21–32)
CREAT SERPL-MCNC: 2.4 MG/DL (ref 0.6–1.3)
EOSINOPHIL NFR BLD AUTO: 2.1 % (ref 0–6)
GAMMA GLOB SERPL ELPH-MCNC: 0.8 G/DL (ref 0.4–1.8)
GLOBULIN SER CALC-MCNC: 2.7 G/DL (ref 2.2–3.9)
GLUCOSE SERPL-MCNC: 100 MG/DL (ref 74–106)
HCT VFR BLD AUTO: 24 % (ref 33–45)
HGB BLD-MCNC: 7.6 G/DL (ref 11.5–14.8)
LYMPHOCYTES NFR BLD AUTO: 2.4 /CMM (ref 0.8–4.8)
LYMPHOCYTES NFR BLD AUTO: 26.8 % (ref 20–44)
MCHC RBC AUTO-ENTMCNC: 31 G/DL (ref 31–36)
MCV RBC AUTO: 86 FL (ref 82–100)
MONOCYTES NFR BLD AUTO: 0.9 /CMM (ref 0.1–1.3)
MONOCYTES NFR BLD AUTO: 10.5 % (ref 2–12)
NEUTROPHILS # BLD AUTO: 5.4 /CMM (ref 1.8–8.9)
NEUTROPHILS NFR BLD AUTO: 60.5 % (ref 43–81)
PLATELET # BLD AUTO: 200 /CMM (ref 150–450)
POTASSIUM SERPL-SCNC: 4.4 MMOL/L (ref 3.5–5.1)
PTH-INTACT SERPL-MCNC: 198 PG/ML (ref 15–65)
RBC # BLD AUTO: 2.81 MIL/UL (ref 4–5.2)
SODIUM SERPL-SCNC: 144 MMOL/L (ref 136–145)
WBC NRBC COR # BLD AUTO: 9 K/UL (ref 4.3–11)

## 2019-02-14 RX ADMIN — NEOMYCIN AND POLYMYXIN B SULFATES AND BACITRACIN ZINC SCH APPLIC: 400; 3.5; 5 OINTMENT TOPICAL at 09:20

## 2019-02-14 RX ADMIN — HYDROMORPHONE HYDROCHLORIDE PRN MG: 2 INJECTION, SOLUTION INTRAMUSCULAR; INTRAVENOUS; SUBCUTANEOUS at 17:43

## 2019-02-14 RX ADMIN — Medication PRN EACH: at 00:47

## 2019-02-14 RX ADMIN — SODIUM CHLORIDE PRN MLS/HR: 9 INJECTION, SOLUTION INTRAVENOUS at 21:58

## 2019-02-14 RX ADMIN — Medication SCH MG: at 21:30

## 2019-02-14 RX ADMIN — SODIUM CHLORIDE SCH MG: 9 INJECTION, SOLUTION INTRAVENOUS at 09:12

## 2019-02-14 RX ADMIN — ATORVASTATIN CALCIUM SCH MG: 10 TABLET, FILM COATED ORAL at 09:21

## 2019-02-14 RX ADMIN — AMIODARONE HYDROCHLORIDE SCH MG: 200 TABLET ORAL at 17:46

## 2019-02-14 RX ADMIN — Medication SCH MG: at 09:14

## 2019-02-14 RX ADMIN — MIRTAZAPINE SCH MG: 15 TABLET, FILM COATED ORAL at 21:30

## 2019-02-14 RX ADMIN — METOPROLOL TARTRATE SCH MG: 50 TABLET, FILM COATED ORAL at 09:15

## 2019-02-14 RX ADMIN — Medication SCH MG: at 09:15

## 2019-02-14 RX ADMIN — SODIUM CHLORIDE PRN MLS/HR: 9 INJECTION, SOLUTION INTRAVENOUS at 00:40

## 2019-02-14 RX ADMIN — HYDROMORPHONE HYDROCHLORIDE PRN MG: 2 INJECTION, SOLUTION INTRAMUSCULAR; INTRAVENOUS; SUBCUTANEOUS at 05:00

## 2019-02-14 RX ADMIN — Medication PRN EACH: at 10:12

## 2019-02-14 RX ADMIN — Medication PRN EACH: at 21:31

## 2019-02-14 RX ADMIN — AMIODARONE HYDROCHLORIDE SCH MG: 200 TABLET ORAL at 09:15

## 2019-02-14 RX ADMIN — METOPROLOL TARTRATE SCH MG: 50 TABLET, FILM COATED ORAL at 21:00

## 2019-02-14 RX ADMIN — ASPIRIN 81 MG SCH MG: 81 TABLET ORAL at 09:09

## 2019-02-14 NOTE — NUR
RN OPENING NOTES

RECEIVED PATIENT AWAKE ON BED, A/OX2-3, Belarusian SPEAKING, ABLE TO MAKE NEEDS KNOWN. NOT IN 
ANY FORM OF DISTRESS. NO SOB. NO S/S OF PAIN OR DISCOMFORT AT THIS TIME. IV ACCESS INTACT 
AND PATENT. SHOULDER SLING AND SPLINT IN PLACE, NO PAIN NOTED AT THIS TIME. BED IN 
LOW/LOCKED POSITION, SIDERIALS UP X2, BED ALARM ON, HOB ELEVATED. CALL LIGHT IN REACH. WILL 
CONTINUE TO MONITOR ACCORDINGLY.

## 2019-02-14 NOTE — NUR
RN NOTES

COMPLAINED OF LEFT SHOULDER PAIN, PAIN LEVEL8/10... DILAUDID 1MG IV GIVEN AS ORDERED, V/S 
STABLE

## 2019-02-14 NOTE — NUR
MS/RN

PATIENT IS AWAKE, ALERT, ORIENTED, COMFORTABLE, NO C/O PAIN, NO DISTRESS NOTED, CALL LIGHT 
IN REACH. FALL PRECAUTION. WILL MONITOR.

## 2019-02-14 NOTE — NUR
RN NOTES

AWAKE, MORNING CARE RENDERED, LOOKS COMFORTABLE, LEFT SHOULDER SLING IN PLACE, NO SOB, CLL 
LIGHT WITHIN REACH, SIDERAILSUPX2, PT. NEEDS ATTENDED

## 2019-02-15 VITALS — SYSTOLIC BLOOD PRESSURE: 117 MMHG | DIASTOLIC BLOOD PRESSURE: 53 MMHG

## 2019-02-15 LAB
BUN SERPL-MCNC: 53 MG/DL (ref 7–18)
CALCIUM SERPL-MCNC: 6.7 MG/DL (ref 8.5–10.1)
CHLORIDE SERPL-SCNC: 117 MMOL/L (ref 98–107)
CO2 SERPL-SCNC: 17 MMOL/L (ref 21–32)
CREAT SERPL-MCNC: 1.7 MG/DL (ref 0.6–1.3)
GLUCOSE SERPL-MCNC: 92 MG/DL (ref 74–106)
POTASSIUM SERPL-SCNC: 3.6 MMOL/L (ref 3.5–5.1)
SODIUM SERPL-SCNC: 148 MMOL/L (ref 136–145)

## 2019-02-15 RX ADMIN — ACETAMINOPHEN PRN MG: 325 TABLET ORAL at 15:56

## 2019-02-15 RX ADMIN — SODIUM CHLORIDE SCH MG: 9 INJECTION, SOLUTION INTRAVENOUS at 09:00

## 2019-02-15 RX ADMIN — AMIODARONE HYDROCHLORIDE SCH MG: 200 TABLET ORAL at 08:53

## 2019-02-15 RX ADMIN — Medication SCH MG: at 08:53

## 2019-02-15 RX ADMIN — ACETAMINOPHEN PRN MG: 325 TABLET ORAL at 09:00

## 2019-02-15 RX ADMIN — ASPIRIN 81 MG SCH MG: 81 TABLET ORAL at 08:53

## 2019-02-15 RX ADMIN — NEOMYCIN AND POLYMYXIN B SULFATES AND BACITRACIN ZINC SCH APPLIC: 400; 3.5; 5 OINTMENT TOPICAL at 08:52

## 2019-02-15 RX ADMIN — ATORVASTATIN CALCIUM SCH MG: 10 TABLET, FILM COATED ORAL at 08:53

## 2019-02-15 RX ADMIN — Medication PRN EACH: at 02:36

## 2019-02-15 RX ADMIN — METOPROLOL TARTRATE SCH MG: 50 TABLET, FILM COATED ORAL at 08:53

## 2019-02-15 NOTE — NUR
MS/RN

PATIENT IS SLEEPING, AROUSABLE, APPEAR COMFORTABLE, NO DISTRESS NOTED, ALL NEEDS ATTENDED AT 
THIS TIME, WILL CONTINUE TO MONITOR.

## 2019-02-15 NOTE — NUR
MS/RN

PATIENT WAS MOANING SAYING THAT HER LEFT ARM HURTS, SIGNED OUT DILAUDID BUT UNABLE TO GIVE 
BECAUSE PATIENT WAS ALREADY SLEEPING. WILL GIVE DILAUDID LATER WHEN AWAKE.

## 2019-02-15 NOTE — NUR
MS/RN DISCHARGE



PATIENT DISCHARGE HOME WITH HOME HEALTH PER FAMILY CHOICE. A/O X 2-3, Filipino SPEAKING. 
DISCHARGE EDUCATION AND TEACHINGS PROVIDED TO PATIENT'S PRIMARY CAREGIVER SON. VERBALIZED 
UNDERSTANDING. PRESCRIPTIONS PROVIDED, ORDER FOR HOME HEALTH PROVIDED. NAME BAND AND IV LINE 
REMOVED. LEFT WITH LEFT ARM SLING. TOLERATED WELL. ALL NEEDS ATTENDED TO. LEFT IN STABLE 
CONDITION VIA AMBULANCE ACCOMPANIED BY 2 EMTS,  AND SON.

## 2019-02-15 NOTE — NUR
MS/RN

DILAUDID 1 MG IVP WAS NO GIVEN BECAUSE PATIENT STILL SLEEPING. MEDICATION WAS WASTED WITH 
ALVA GODDARD, AS WITNESS.

## 2019-02-15 NOTE — NUR
MS/RN OPENING NOTE



PATIENT IN BED IN STABLE CONDITION. A/O X 2, Colombian SPEAKING. NO SIGNS OF ACUTE DISTRESS. 
NO COMPLAIN OF PAIN OR DISCOMFORT. ALL NEEDS ATTENDED TO. CALL LIGHT WITHIN REACH. WILL 
CONTINUE TO MONITOR TO ENSURE SAFETY.

## 2019-02-21 ENCOUNTER — HOSPITAL ENCOUNTER (INPATIENT)
Dept: HOSPITAL 54 - ER | Age: 84
LOS: 4 days | Discharge: HOME HEALTH | DRG: 291 | End: 2019-02-25
Attending: INTERNAL MEDICINE | Admitting: INTERNAL MEDICINE
Payer: MEDICARE

## 2019-02-21 VITALS — BODY MASS INDEX: 31.22 KG/M2 | HEIGHT: 60 IN | WEIGHT: 159 LBS

## 2019-02-21 VITALS — SYSTOLIC BLOOD PRESSURE: 131 MMHG | DIASTOLIC BLOOD PRESSURE: 62 MMHG

## 2019-02-21 DIAGNOSIS — E83.42: ICD-10-CM

## 2019-02-21 DIAGNOSIS — I48.91: ICD-10-CM

## 2019-02-21 DIAGNOSIS — K21.9: ICD-10-CM

## 2019-02-21 DIAGNOSIS — R64: ICD-10-CM

## 2019-02-21 DIAGNOSIS — I50.33: ICD-10-CM

## 2019-02-21 DIAGNOSIS — I13.0: Primary | ICD-10-CM

## 2019-02-21 DIAGNOSIS — Z91.81: ICD-10-CM

## 2019-02-21 DIAGNOSIS — I27.20: ICD-10-CM

## 2019-02-21 DIAGNOSIS — I25.10: ICD-10-CM

## 2019-02-21 DIAGNOSIS — D68.59: ICD-10-CM

## 2019-02-21 DIAGNOSIS — E44.0: ICD-10-CM

## 2019-02-21 DIAGNOSIS — N18.9: ICD-10-CM

## 2019-02-21 DIAGNOSIS — Z74.01: ICD-10-CM

## 2019-02-21 DIAGNOSIS — Z79.82: ICD-10-CM

## 2019-02-21 DIAGNOSIS — Z95.2: ICD-10-CM

## 2019-02-21 DIAGNOSIS — Z66: ICD-10-CM

## 2019-02-21 DIAGNOSIS — L03.115: ICD-10-CM

## 2019-02-21 DIAGNOSIS — N25.81: ICD-10-CM

## 2019-02-21 DIAGNOSIS — E78.5: ICD-10-CM

## 2019-02-21 DIAGNOSIS — D63.8: ICD-10-CM

## 2019-02-21 DIAGNOSIS — Z95.1: ICD-10-CM

## 2019-02-21 DIAGNOSIS — I11.0: ICD-10-CM

## 2019-02-21 DIAGNOSIS — M85.9: ICD-10-CM

## 2019-02-21 DIAGNOSIS — N17.0: ICD-10-CM

## 2019-02-21 DIAGNOSIS — L03.116: ICD-10-CM

## 2019-02-21 DIAGNOSIS — Z79.899: ICD-10-CM

## 2019-02-21 DIAGNOSIS — Z87.81: ICD-10-CM

## 2019-02-21 DIAGNOSIS — M62.84: ICD-10-CM

## 2019-02-21 DIAGNOSIS — Z96.649: ICD-10-CM

## 2019-02-21 DIAGNOSIS — E61.1: ICD-10-CM

## 2019-02-21 LAB
ALBUMIN SERPL BCP-MCNC: 3 G/DL (ref 3.4–5)
ALP SERPL-CCNC: 67 U/L (ref 46–116)
ALT SERPL W P-5'-P-CCNC: 31 U/L (ref 12–78)
AST SERPL W P-5'-P-CCNC: 25 U/L (ref 15–37)
BASOPHILS # BLD AUTO: 0 /CMM (ref 0–0.2)
BASOPHILS NFR BLD AUTO: 0.3 % (ref 0–2)
BILIRUB DIRECT SERPL-MCNC: 0.2 MG/DL (ref 0–0.2)
BILIRUB SERPL-MCNC: 0.4 MG/DL (ref 0.2–1)
BUN SERPL-MCNC: 80 MG/DL (ref 7–18)
CALCIUM SERPL-MCNC: 8.5 MG/DL (ref 8.5–10.1)
CHLORIDE SERPL-SCNC: 105 MMOL/L (ref 98–107)
CO2 SERPL-SCNC: 21 MMOL/L (ref 21–32)
CREAT SERPL-MCNC: 2.5 MG/DL (ref 0.6–1.3)
EOSINOPHIL NFR BLD AUTO: 2.7 % (ref 0–6)
GLUCOSE SERPL-MCNC: 95 MG/DL (ref 74–106)
HCT VFR BLD AUTO: 26 % (ref 33–45)
HGB BLD-MCNC: 8 G/DL (ref 11.5–14.8)
LYMPHOCYTES NFR BLD AUTO: 1.7 /CMM (ref 0.8–4.8)
LYMPHOCYTES NFR BLD AUTO: 20.2 % (ref 20–44)
MCHC RBC AUTO-ENTMCNC: 31 G/DL (ref 31–36)
MCV RBC AUTO: 86 FL (ref 82–100)
MONOCYTES NFR BLD AUTO: 1 /CMM (ref 0.1–1.3)
MONOCYTES NFR BLD AUTO: 12.1 % (ref 2–12)
NEUTROPHILS # BLD AUTO: 5.5 /CMM (ref 1.8–8.9)
NEUTROPHILS NFR BLD AUTO: 64.7 % (ref 43–81)
NT-PROBNP SERPL-MCNC: 5421 PG/ML (ref 0–125)
PLATELET # BLD AUTO: 337 /CMM (ref 150–450)
POTASSIUM SERPL-SCNC: 4.6 MMOL/L (ref 3.5–5.1)
PROT SERPL-MCNC: 6.5 G/DL (ref 6.4–8.2)
RBC # BLD AUTO: 2.97 MIL/UL (ref 4–5.2)
SODIUM SERPL-SCNC: 139 MMOL/L (ref 136–145)
WBC NRBC COR # BLD AUTO: 8.6 K/UL (ref 4.3–11)

## 2019-02-21 PROCEDURE — G0378 HOSPITAL OBSERVATION PER HR: HCPCS

## 2019-02-21 RX ADMIN — Medication PRN EACH: at 18:32

## 2019-02-21 RX ADMIN — FUROSEMIDE SCH MG: 10 INJECTION, SOLUTION INTRAMUSCULAR; INTRAVENOUS at 21:20

## 2019-02-21 NOTE — NUR
TELE LVN INITIAL NOTES

 RECEIVED REPORT FROM AM NURSE WHILE CHECKING THE PATIENT. SHE'S  AWAKE AND ALERT ON SITTING 
POSITION. Telugu SPEAKING BUT SON AT THE BEDSIDE TO HELPED TO TRANSLATE, PAIN MEDICATION 
JUST GIVEN BY AM NURSE. PT LEFT ARM WITH ORTHOPEDIC SLING AND PER SON DON'T REMOVED IT. PT 
AWARE SHE'S IN THE HOSPITAL AT THIS TIME. NO SIGNS OF ANY ACUTE DISTRESS NOTED. ORIENTED HOW 
TO USED THE CALL LIGHT. KEPT HER WARM AND COMFORTABLE AT ALL TIMES. TELE SR PER MONITOR. 
PLACE CALL LIGHT AT REACH. BED ALARM SET FOR SAFETY.

## 2019-02-21 NOTE — NUR
HÉCTOR RN ADMISSION NOTES



RECEIVED PT FROM ER NURSE IN STABLE CONDITION. OT ADMITTED UNDER DR. DUQUE FOR CHF 
EXACERBATION. PT IS A/O X3. NO SOB NOTED. BREATHING EVEN AND UNLABORED. PT ON RA AND SATING 
WELL. SHE COMPLAINS OF LEFT SHOULDER PAIN. WILL ADMINISTER PAIN MEDICATION WHEN DO. IV TO 
RIGHT WRIST NOTED TO BE PATENT AND INTACT. NO REDNESS OR SIGNS OF INFILTRATION NOTED. LEFT 
ARM IN PERSONAL BRACE. BILATERAL LOWER EXTREMITY PITTING EDEMA NOTED. VSS. PT ORIENTED TO 
ROOM AND USE OF CALL LIGHT. PT CURRENTLY SR ON THE TELE MONITOR. BELONGINGS VERIFIED BY CNA. 
BED IN LOW LOCKED POSITION, SIDE RIALS UP X2, CALL LIGHT WITHIN REACH. WILL CONTINUE 
ADMISSION PROCESS

## 2019-02-21 NOTE — NUR
BIB PRIVATE AMB FROM HOME  89 YEAR OLD FEMALE FOR BLE EDEMA,SOB, GENERALIZED 
WEAKNESS ADMITTED 4/11 FOR HUMERAL FRACTURE AND ARF. ALERT AND OREINTED X3, 
BREATHING EVEN AND UNLABORED WITH NO DISTRESS NOTED. SKIN WARM TO TOUCH AND 
INTACT. AWAITING TO BE SEEN BY MD

## 2019-02-22 VITALS — DIASTOLIC BLOOD PRESSURE: 45 MMHG | SYSTOLIC BLOOD PRESSURE: 106 MMHG

## 2019-02-22 VITALS — SYSTOLIC BLOOD PRESSURE: 111 MMHG | DIASTOLIC BLOOD PRESSURE: 60 MMHG

## 2019-02-22 VITALS — DIASTOLIC BLOOD PRESSURE: 49 MMHG | SYSTOLIC BLOOD PRESSURE: 101 MMHG

## 2019-02-22 VITALS — DIASTOLIC BLOOD PRESSURE: 64 MMHG | SYSTOLIC BLOOD PRESSURE: 126 MMHG

## 2019-02-22 VITALS — DIASTOLIC BLOOD PRESSURE: 49 MMHG | SYSTOLIC BLOOD PRESSURE: 95 MMHG

## 2019-02-22 VITALS — DIASTOLIC BLOOD PRESSURE: 64 MMHG | SYSTOLIC BLOOD PRESSURE: 121 MMHG

## 2019-02-22 LAB
ALBUMIN SERPL BCP-MCNC: 3 G/DL (ref 3.4–5)
ALP SERPL-CCNC: 67 U/L (ref 46–116)
ALT SERPL W P-5'-P-CCNC: 31 U/L (ref 12–78)
APPEARANCE UR: CLEAR
AST SERPL W P-5'-P-CCNC: 31 U/L (ref 15–37)
BASOPHILS # BLD AUTO: 0 /CMM (ref 0–0.2)
BASOPHILS NFR BLD AUTO: 0.2 % (ref 0–2)
BILIRUB SERPL-MCNC: 0.4 MG/DL (ref 0.2–1)
BILIRUB UR QL STRIP: NEGATIVE
BUN SERPL-MCNC: 80 MG/DL (ref 7–18)
CALCIUM SERPL-MCNC: 8.4 MG/DL (ref 8.5–10.1)
CHLORIDE SERPL-SCNC: 105 MMOL/L (ref 98–107)
CHOLEST SERPL-MCNC: 114 MG/DL (ref ?–200)
CO2 SERPL-SCNC: 19 MMOL/L (ref 21–32)
COLOR UR: YELLOW
CREAT SERPL-MCNC: 2.6 MG/DL (ref 0.6–1.3)
CREAT UR-MCNC: 28.8 MG/DL (ref 30–125)
EOSINOPHIL NFR BLD AUTO: 3.1 % (ref 0–6)
GLUCOSE SERPL-MCNC: 119 MG/DL (ref 74–106)
GLUCOSE UR STRIP-MCNC: NEGATIVE MG/DL
HCT VFR BLD AUTO: 24 % (ref 33–45)
HDLC SERPL-MCNC: 36 MG/DL (ref 40–60)
HGB BLD-MCNC: 7.5 G/DL (ref 11.5–14.8)
HGB UR QL STRIP: NEGATIVE ERY/UL
KETONES UR STRIP-MCNC: NEGATIVE MG/DL
LDLC SERPL DIRECT ASSAY-MCNC: 64 MG/DL (ref 0–99)
LEUKOCYTE ESTERASE UR QL STRIP: NEGATIVE
LYMPHOCYTES NFR BLD AUTO: 1.7 /CMM (ref 0.8–4.8)
LYMPHOCYTES NFR BLD AUTO: 20.3 % (ref 20–44)
MAGNESIUM SERPL-MCNC: 1.6 MG/DL (ref 1.8–2.4)
MCHC RBC AUTO-ENTMCNC: 32 G/DL (ref 31–36)
MCV RBC AUTO: 85 FL (ref 82–100)
MONOCYTES NFR BLD AUTO: 1 /CMM (ref 0.1–1.3)
MONOCYTES NFR BLD AUTO: 11.4 % (ref 2–12)
NEUTROPHILS # BLD AUTO: 5.5 /CMM (ref 1.8–8.9)
NEUTROPHILS NFR BLD AUTO: 65 % (ref 43–81)
NITRITE UR QL STRIP: NEGATIVE
PH UR STRIP: 5 [PH] (ref 5–8)
PHOSPHATE SERPL-MCNC: 4.7 MG/DL (ref 2.5–4.9)
PLATELET # BLD AUTO: 306 /CMM (ref 150–450)
POTASSIUM SERPL-SCNC: 4.1 MMOL/L (ref 3.5–5.1)
PROT SERPL-MCNC: 6.4 G/DL (ref 6.4–8.2)
PROT UR QL STRIP: NEGATIVE MG/DL
PROT UR-MCNC: 2.2 MG/DL (ref 0–11.9)
RBC # BLD AUTO: 2.77 MIL/UL (ref 4–5.2)
SODIUM SERPL-SCNC: 139 MMOL/L (ref 136–145)
SODIUM UR-SCNC: 83 MMOL/L (ref 40–220)
TRIGL SERPL-MCNC: 115 MG/DL (ref 30–150)
UROBILINOGEN UR STRIP-MCNC: 0.2 EU/DL
WBC NRBC COR # BLD AUTO: 8.5 K/UL (ref 4.3–11)

## 2019-02-22 RX ADMIN — Medication PRN EACH: at 02:17

## 2019-02-22 RX ADMIN — Medication PRN EACH: at 12:23

## 2019-02-22 RX ADMIN — MIRTAZAPINE SCH MG: 15 TABLET, FILM COATED ORAL at 11:30

## 2019-02-22 RX ADMIN — Medication PRN EACH: at 20:24

## 2019-02-22 RX ADMIN — METOPROLOL TARTRATE SCH MG: 50 TABLET, FILM COATED ORAL at 20:25

## 2019-02-22 RX ADMIN — DEXTROSE MONOHYDRATE SCH MLS/HR: 50 INJECTION, SOLUTION INTRAVENOUS at 13:02

## 2019-02-22 RX ADMIN — Medication PRN EACH: at 06:32

## 2019-02-22 RX ADMIN — MIRTAZAPINE SCH MG: 15 TABLET, FILM COATED ORAL at 21:41

## 2019-02-22 RX ADMIN — FUROSEMIDE SCH MG: 10 INJECTION, SOLUTION INTRAMUSCULAR; INTRAVENOUS at 08:42

## 2019-02-22 RX ADMIN — ASPIRIN 81 MG SCH MG: 81 TABLET ORAL at 11:59

## 2019-02-22 RX ADMIN — AMIODARONE HYDROCHLORIDE SCH MG: 200 TABLET ORAL at 11:58

## 2019-02-22 RX ADMIN — AMIODARONE HYDROCHLORIDE SCH MG: 200 TABLET ORAL at 16:21

## 2019-02-22 NOTE — NUR
TELE RN OPENING  NOTES

 RECEIVED REPORT FROM NURSE.PATIENT IS  AWAKE AND ALERT ,AXOX2.[place] SPEAKING UNDERSTANDS 
SIMPLE ENGLISH.ON TELE MONITOR SR WITH BBB HR 66. PT LEFT ARM WITH ORTHOPEDIC SLING AND PER 
SON DON'T REMOVED IT. PT AWARE SHE'S IN THE HOSPITAL AT THIS TIME. NO SIGNS OF ANY ACUTE 
DISTRESS NOTED.ON O2 2L VIA NASAL CANULA. ORIENTED HOW TO USED THE CALL LIGHT. BED IS LOW 
AND IN LOCKED  POSITION.CALL LIGHT IN REACH.SRX3.WILL CONTINUE TO MONITOR.

## 2019-02-22 NOTE — NUR
TELE LVN NOTES

 CHECKED PT AND SAW HER SLEEPING AFTER PAIN MEDS GIVEN , SHE SEEMS COMFORTABLE AT THIS TIME. 
KEPT HER WARM AND COMFORTABLE AT ALL TIMES. PLACE CALL LIGHT AT REACH.

## 2019-02-22 NOTE — NUR
TELE LVN NOTES

 PT RESTING AT THIS TIME AFTER MORNING CARE DONE. NORCO TABLET ALSO GIVEN PT PT REQUESTED. 
PT ONLY COMPLAINT IS THE LEFT ARM WITH SLING ON APPLIED BY ORTHO DOCTOR OUTSIDE HOSPITAL. 
ALL DUE MEDS GIVEN AND ALL NEEDS MET. TELE SINUS RHYTHM PER MONITOR. KEPT HER WARM AND 
COMFORTABLE AT ALL TIMES. PLACE CALL LIGHT AT REACH. ENDORSE TO AM NURSE.

## 2019-02-22 NOTE — NUR
TELE LVN NOTES

 PT WOKE UP AND COMPLAINING OF PAIN ON  HER LEFT ARM WITH THE SLING/BRACES ON. NORCO TABLET 
GIVEN AS ORDERED AND REPOSITION HER FOR COMFORT. WILL CONTINUE MONITORING. PLACE CALL LIGHT 
AT REACH.

## 2019-02-22 NOTE — NUR
TELE NOTES

RECEIVED PT AWAKE,W/ AT BEDSIDE.PT C/O CONSTIPATION AND PAIN 7/10 HUMERUS SITE WHICH 
IS ON A SLING.GIVEN MOM 30ML FOR ABOVE AND NORCO 5/325 FOR PAIN.MONITOR SHOWS NSR,ON ROOM 
AIR W/SAT OF 99%.

## 2019-02-22 NOTE — NUR
TELE RN NOTE

SEEN BY ,UPDATED ABOUT PATIENT CONDITION,GOT NEW ORDERS.REQUESTED TO DO MED 
RECON.FAMILY AT BEDSIDE.WILL CONTINUE TO MONITOR.

## 2019-02-22 NOTE — NUR
WOUND CARE CONSULT   WOUND CARE RECEIVED CONSULT FOR GROIN REDNESS, BREAST FOLD REDNESS.  
WOUND CARE WILL DEFER CONSULT AND ALL TREATMENT PLANS TO PLASTIC SURGICAL TEAM.  PLASTIC 
TEAM HAS BEEN NOTIFIED OF CONSULT.  PATIENT WITH PAULINO AT 15, ALL PRESSURE ULCER PREVENTION 
MEASURES ARE NOTED TO BE IN PLACE.  WILL SEE PRN.

## 2019-02-22 NOTE — NUR
TELE LVN NOTES

 PT SLEEPING COMFORTABLY IN BED WITHOUT ANY ACUTE DISTRESS NOTED. RESPIRATION EVEN AND 
NON-LABORED. NO SOB NOTED. KEPT HER WARM AND COMFORTABLE AT ALL TIMES. PLACE CALL LIGHT AT 
REACH. WILL CONTINUE MONITORING.

## 2019-02-23 VITALS — SYSTOLIC BLOOD PRESSURE: 137 MMHG | DIASTOLIC BLOOD PRESSURE: 47 MMHG

## 2019-02-23 VITALS — DIASTOLIC BLOOD PRESSURE: 64 MMHG | SYSTOLIC BLOOD PRESSURE: 120 MMHG

## 2019-02-23 VITALS — DIASTOLIC BLOOD PRESSURE: 52 MMHG | SYSTOLIC BLOOD PRESSURE: 119 MMHG

## 2019-02-23 VITALS — SYSTOLIC BLOOD PRESSURE: 109 MMHG | DIASTOLIC BLOOD PRESSURE: 59 MMHG

## 2019-02-23 VITALS — SYSTOLIC BLOOD PRESSURE: 109 MMHG | DIASTOLIC BLOOD PRESSURE: 47 MMHG

## 2019-02-23 LAB
ALBUMIN SERPL BCP-MCNC: 2.8 G/DL (ref 3.4–5)
ALP SERPL-CCNC: 66 U/L (ref 46–116)
ALT SERPL W P-5'-P-CCNC: 23 U/L (ref 12–78)
AST SERPL W P-5'-P-CCNC: 22 U/L (ref 15–37)
BASOPHILS # BLD AUTO: 0 /CMM (ref 0–0.2)
BASOPHILS NFR BLD AUTO: 0.1 % (ref 0–2)
BILIRUB SERPL-MCNC: 0.3 MG/DL (ref 0.2–1)
BUN SERPL-MCNC: 79 MG/DL (ref 7–18)
CALCIUM SERPL-MCNC: 8.5 MG/DL (ref 8.5–10.1)
CHLORIDE SERPL-SCNC: 106 MMOL/L (ref 98–107)
CK SERPL-CCNC: 29 U/L (ref 26–192)
CO2 SERPL-SCNC: 24 MMOL/L (ref 21–32)
CREAT SERPL-MCNC: 2.5 MG/DL (ref 0.6–1.3)
EOSINOPHIL NFR BLD AUTO: 2.9 % (ref 0–6)
GLUCOSE SERPL-MCNC: 117 MG/DL (ref 74–106)
HCT VFR BLD AUTO: 24 % (ref 33–45)
HGB BLD-MCNC: 7.7 G/DL (ref 11.5–14.8)
LYMPHOCYTES NFR BLD AUTO: 1.6 /CMM (ref 0.8–4.8)
LYMPHOCYTES NFR BLD AUTO: 21.2 % (ref 20–44)
MAGNESIUM SERPL-MCNC: 2 MG/DL (ref 1.8–2.4)
MCHC RBC AUTO-ENTMCNC: 32 G/DL (ref 31–36)
MCV RBC AUTO: 84 FL (ref 82–100)
MONOCYTES NFR BLD AUTO: 0.7 /CMM (ref 0.1–1.3)
MONOCYTES NFR BLD AUTO: 9.6 % (ref 2–12)
NEUTROPHILS # BLD AUTO: 5.1 /CMM (ref 1.8–8.9)
NEUTROPHILS NFR BLD AUTO: 66.2 % (ref 43–81)
PHOSPHATE SERPL-MCNC: 4.9 MG/DL (ref 2.5–4.9)
PLATELET # BLD AUTO: 324 /CMM (ref 150–450)
POTASSIUM SERPL-SCNC: 3.7 MMOL/L (ref 3.5–5.1)
PROT SERPL-MCNC: 6.4 G/DL (ref 6.4–8.2)
RBC # BLD AUTO: 2.86 MIL/UL (ref 4–5.2)
SODIUM SERPL-SCNC: 141 MMOL/L (ref 136–145)
WBC NRBC COR # BLD AUTO: 7.7 K/UL (ref 4.3–11)

## 2019-02-23 RX ADMIN — BUMETANIDE SCH MG: 1 TABLET ORAL at 17:46

## 2019-02-23 RX ADMIN — ASPIRIN 81 MG SCH MG: 81 TABLET ORAL at 08:47

## 2019-02-23 RX ADMIN — Medication PRN EACH: at 12:30

## 2019-02-23 RX ADMIN — METOPROLOL TARTRATE SCH MG: 50 TABLET, FILM COATED ORAL at 08:48

## 2019-02-23 RX ADMIN — SODIUM CHLORIDE SCH MLS/HR: 9 INJECTION, SOLUTION INTRAVENOUS at 15:05

## 2019-02-23 RX ADMIN — METOPROLOL TARTRATE SCH MG: 50 TABLET, FILM COATED ORAL at 21:03

## 2019-02-23 RX ADMIN — MIRTAZAPINE SCH MG: 15 TABLET, FILM COATED ORAL at 21:03

## 2019-02-23 RX ADMIN — ATORVASTATIN CALCIUM SCH MG: 10 TABLET, FILM COATED ORAL at 08:47

## 2019-02-23 RX ADMIN — AMIODARONE HYDROCHLORIDE SCH MG: 200 TABLET ORAL at 08:48

## 2019-02-23 RX ADMIN — DEXTROSE MONOHYDRATE SCH MLS/HR: 50 INJECTION, SOLUTION INTRAVENOUS at 12:14

## 2019-02-23 RX ADMIN — Medication PRN EACH: at 19:26

## 2019-02-23 RX ADMIN — Medication PRN EACH: at 00:59

## 2019-02-23 NOTE — NUR
MS RN CLOSING NOTES

PT ENDORSED TO PM SHIFT FOR [place]. PT CRYING IN BED, C/O PAIN IN CHEST. DR HUFFMAN CONTACTED; 
AWAITING ORDERS. STAT EKG ORDERED/ PLACED ON TELE. WILL GIVE NORCO. SON, TALYA, CALLED; SAID 
HE WILL BE HERE SOON. PT ON O2; O2 SAT WNL. CALL LIGHT IN REACH. WILL CONT TO MONITOR.

## 2019-02-23 NOTE — NUR
TELE RN OPENING NOTES

RECEIVED REPORT FROM DARSHANA CALLE. PATIENT A/A/O X2 W/ SOME CONFUSION, MOSTLY [place] SPEAKING 
BUT ABLE TO MAKE SOME NEEDS KNOWN & STATE PAIN. BREATHING EVEN &  TOLERATING ROOM AIR. 
DENIES NAY SOB OR DIFFICULTY BREATHING. ON TELE W/ SINUS RHYTHM W/ BBB, HR 69. RIGHT HAND IV 
#24 INTACT & PATENT W/ DRESSING CDI, SALINE LOCKED. STILL C/O SOME CHEST DISCOMFORT, 
AWAITING TROPONIN RESULTS. SAFETY MEASURES IN PLACE W/ SIDE RAILS UP & BED ALARM ON.  WILL 
CONTINUE TO MONITOR.

## 2019-02-23 NOTE — NUR
TELE RN OPENING  NOTES

 RECEIVED REPORT FROM NURSE.PATIENT IS  AWAKE AND ALERT ,AXOX2.Tajik SPEAKING. ON TELE 
MONITOR SR HR 61. PT'S LEFT ARM WITH ORTHOPEDIC SLING.  NO SIGNS OF DISTRESS NOTED.ON O2 
3.5L VIA NASAL CANULA.  BED IS LOW AND IN LOCKED  POSITION.CALL LIGHT IN REACH.WILL CONTINUE 
TO MONITOR.

## 2019-02-24 VITALS — SYSTOLIC BLOOD PRESSURE: 124 MMHG | DIASTOLIC BLOOD PRESSURE: 41 MMHG

## 2019-02-24 VITALS — DIASTOLIC BLOOD PRESSURE: 52 MMHG | SYSTOLIC BLOOD PRESSURE: 110 MMHG

## 2019-02-24 VITALS — DIASTOLIC BLOOD PRESSURE: 49 MMHG | SYSTOLIC BLOOD PRESSURE: 114 MMHG

## 2019-02-24 VITALS — DIASTOLIC BLOOD PRESSURE: 52 MMHG | SYSTOLIC BLOOD PRESSURE: 133 MMHG

## 2019-02-24 VITALS — SYSTOLIC BLOOD PRESSURE: 117 MMHG | DIASTOLIC BLOOD PRESSURE: 49 MMHG

## 2019-02-24 VITALS — DIASTOLIC BLOOD PRESSURE: 41 MMHG | SYSTOLIC BLOOD PRESSURE: 124 MMHG

## 2019-02-24 VITALS — SYSTOLIC BLOOD PRESSURE: 113 MMHG | DIASTOLIC BLOOD PRESSURE: 37 MMHG

## 2019-02-24 VITALS — SYSTOLIC BLOOD PRESSURE: 112 MMHG | DIASTOLIC BLOOD PRESSURE: 60 MMHG

## 2019-02-24 LAB
ALBUMIN SERPL BCP-MCNC: 2.8 G/DL (ref 3.4–5)
ALP SERPL-CCNC: 64 U/L (ref 46–116)
ALT SERPL W P-5'-P-CCNC: 23 U/L (ref 12–78)
AST SERPL W P-5'-P-CCNC: 19 U/L (ref 15–37)
BASOPHILS # BLD AUTO: 0 /CMM (ref 0–0.2)
BASOPHILS NFR BLD AUTO: 0.1 % (ref 0–2)
BILIRUB SERPL-MCNC: 0.4 MG/DL (ref 0.2–1)
BUN SERPL-MCNC: 72 MG/DL (ref 7–18)
CALCIUM SERPL-MCNC: 8.5 MG/DL (ref 8.5–10.1)
CHLORIDE SERPL-SCNC: 108 MMOL/L (ref 98–107)
CO2 SERPL-SCNC: 24 MMOL/L (ref 21–32)
CREAT SERPL-MCNC: 2.1 MG/DL (ref 0.6–1.3)
EOSINOPHIL NFR BLD AUTO: 3.9 % (ref 0–6)
GLUCOSE SERPL-MCNC: 103 MG/DL (ref 74–106)
HCT VFR BLD AUTO: 23 % (ref 33–45)
HGB BLD-MCNC: 7.4 G/DL (ref 11.5–14.8)
LYMPHOCYTES NFR BLD AUTO: 1.1 /CMM (ref 0.8–4.8)
LYMPHOCYTES NFR BLD AUTO: 13.4 % (ref 20–44)
MAGNESIUM SERPL-MCNC: 2.2 MG/DL (ref 1.8–2.4)
MCHC RBC AUTO-ENTMCNC: 32 G/DL (ref 31–36)
MCV RBC AUTO: 84 FL (ref 82–100)
MONOCYTES NFR BLD AUTO: 0.8 /CMM (ref 0.1–1.3)
MONOCYTES NFR BLD AUTO: 9.6 % (ref 2–12)
NEUTROPHILS # BLD AUTO: 6 /CMM (ref 1.8–8.9)
NEUTROPHILS NFR BLD AUTO: 73 % (ref 43–81)
PHOSPHATE SERPL-MCNC: 4 MG/DL (ref 2.5–4.9)
PLATELET # BLD AUTO: 320 /CMM (ref 150–450)
POTASSIUM SERPL-SCNC: 3.5 MMOL/L (ref 3.5–5.1)
PROT SERPL-MCNC: 6 G/DL (ref 6.4–8.2)
RBC # BLD AUTO: 2.77 MIL/UL (ref 4–5.2)
SODIUM SERPL-SCNC: 147 MMOL/L (ref 136–145)
WBC NRBC COR # BLD AUTO: 8.2 K/UL (ref 4.3–11)

## 2019-02-24 RX ADMIN — AMIODARONE HYDROCHLORIDE SCH MG: 200 TABLET ORAL at 08:57

## 2019-02-24 RX ADMIN — Medication PRN EACH: at 13:52

## 2019-02-24 RX ADMIN — MIRTAZAPINE SCH MG: 15 TABLET, FILM COATED ORAL at 21:09

## 2019-02-24 RX ADMIN — ATORVASTATIN CALCIUM SCH MG: 10 TABLET, FILM COATED ORAL at 08:54

## 2019-02-24 RX ADMIN — Medication PRN EACH: at 04:04

## 2019-02-24 RX ADMIN — METOPROLOL TARTRATE SCH MG: 50 TABLET, FILM COATED ORAL at 21:09

## 2019-02-24 RX ADMIN — SODIUM CHLORIDE SCH MLS/HR: 9 INJECTION, SOLUTION INTRAVENOUS at 15:37

## 2019-02-24 RX ADMIN — ASPIRIN 81 MG SCH MG: 81 TABLET ORAL at 08:57

## 2019-02-24 RX ADMIN — BUMETANIDE SCH MG: 1 TABLET ORAL at 08:57

## 2019-02-24 RX ADMIN — Medication PRN EACH: at 21:10

## 2019-02-24 RX ADMIN — METOPROLOL TARTRATE SCH MG: 50 TABLET, FILM COATED ORAL at 08:55

## 2019-02-24 RX ADMIN — BUMETANIDE SCH MG: 1 TABLET ORAL at 17:09

## 2019-02-24 RX ADMIN — DEXTROSE MONOHYDRATE SCH MLS/HR: 50 INJECTION, SOLUTION INTRAVENOUS at 12:01

## 2019-02-24 RX ADMIN — Medication PRN EACH: at 18:17
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
## 2019-02-25 VITALS — SYSTOLIC BLOOD PRESSURE: 144 MMHG | DIASTOLIC BLOOD PRESSURE: 48 MMHG

## 2019-02-25 VITALS — SYSTOLIC BLOOD PRESSURE: 132 MMHG | DIASTOLIC BLOOD PRESSURE: 58 MMHG

## 2019-02-25 VITALS — DIASTOLIC BLOOD PRESSURE: 43 MMHG | SYSTOLIC BLOOD PRESSURE: 141 MMHG

## 2019-02-25 VITALS — DIASTOLIC BLOOD PRESSURE: 59 MMHG | SYSTOLIC BLOOD PRESSURE: 140 MMHG

## 2019-02-25 LAB
ALBUMIN SERPL ELPH-MCNC: 3 G/DL (ref 2.9–4.4)
ALBUMIN/GLOB SERPL: 1.1 {RATIO} (ref 0.7–1.7)
ALPHA1 GLOB SERPL ELPH-MCNC: 0.4 G/DL (ref 0–0.4)
ALPHA2 GLOB SERPL ELPH-MCNC: 0.9 G/DL (ref 0.4–1)
B-GLOBULIN SERPL ELPH-MCNC: 0.8 G/DL (ref 0.7–1.3)
GAMMA GLOB SERPL ELPH-MCNC: 0.7 G/DL (ref 0.4–1.8)
GLOBULIN SER CALC-MCNC: 2.8 G/DL (ref 2.2–3.9)

## 2019-02-25 RX ADMIN — SODIUM CHLORIDE SCH MLS/HR: 9 INJECTION, SOLUTION INTRAVENOUS at 14:57

## 2019-02-25 RX ADMIN — ASPIRIN 81 MG SCH MG: 81 TABLET ORAL at 09:20

## 2019-02-25 RX ADMIN — AMIODARONE HYDROCHLORIDE SCH MG: 200 TABLET ORAL at 09:00

## 2019-02-25 RX ADMIN — ATORVASTATIN CALCIUM SCH MG: 10 TABLET, FILM COATED ORAL at 09:20

## 2019-02-25 RX ADMIN — BUMETANIDE SCH MG: 1 TABLET ORAL at 17:20

## 2019-02-25 RX ADMIN — Medication PRN EACH: at 13:37

## 2019-02-25 RX ADMIN — Medication PRN EACH: at 04:08

## 2019-02-25 RX ADMIN — BUMETANIDE SCH MG: 1 TABLET ORAL at 09:20

## 2019-02-25 RX ADMIN — DEXTROSE MONOHYDRATE SCH MLS/HR: 50 INJECTION, SOLUTION INTRAVENOUS at 11:40

## 2019-02-25 RX ADMIN — METOPROLOL TARTRATE SCH MG: 50 TABLET, FILM COATED ORAL at 09:00

## 2019-02-25 NOTE — NUR
MS RN CLOSING NOTE



PT IN BED, ALERT AND ORIENTED X1-2. PT IS PRIMARILY [place]ian SPEAKING, ABLE TO MAKE BASIC 
NEEDS INCLUDING PAIN AND DISCOMFORT KNOWN. NO ACUTE DISTRESS NOTED AT THIS TIME. BREATHING 
IS EVEN AND UNLABORED ON 3.5L NC. R FA #[id number] IV IS PATENT, CLEAN, DRY AND INTACT. ASPIRATION 
PRECAUTIONS MAINTAINED. LEFT ARM SLING IN PLACE AND NEUROVASCULAR STATUS INTACT. ADLS 
PROVIDED AND PT ASSISTED TO TURN AND REPOSITION Q2H FOR THE DURATION OF THE SHIFT. ALL NEEDS 
ATTENDED TO. BED IS LOCKED AND IN LOWEST POSITION, SIDE RAILS UP X3, BED ALARM ON, CALL 
LIGHT AND POSSESSIONS WITHIN REACH. PT IS FOR DC TODAY HOME VIA AMBULANCE AT 7:30. DISCHARGE 
PAPERWORK AND EDUCATION AND WOUND DOCUMENTATION COMPELTED PER PROTOCL.

## 2019-02-25 NOTE — NUR
MS RN OPENING NOTE



RECIEVED PT IN BED, ALERT AND ORIENTED X1-2. PT IS PRIMARILY [place] SPEAKING, ABLE TO MAKE 
BASIC NEEDS INCLUDING PAIN AND DISCOMFORT KNOWN. NO ACUTE DISTRESS NOTED AT THIS TIME. 
BREATHING IS EVEN AND UNLABORED ON 3.5L NC. R FA #[id number] IV IS PATENT, CLEAN, DRY AND INTACT. 
ASPIRATION PRECAUTIONS MAINTAINED. LEFT ARM SLING IN PLACE AND NEUROVASCULAR STATUS INTACT. 
ALL NEEDS ATTENDED TO. BED IS LOCKED AND IN LOWEST POSITION, SIDE RAILS UP X3, BED ALARM ON, 
CALL LIGHT AND POSSESSIONS WITHIN REACH.

## 2019-02-26 LAB — PTH-INTACT SERPL-MCNC: 87 PG/ML (ref 15–65)
